# Patient Record
Sex: MALE | Race: WHITE | NOT HISPANIC OR LATINO | Employment: UNEMPLOYED | URBAN - METROPOLITAN AREA
[De-identification: names, ages, dates, MRNs, and addresses within clinical notes are randomized per-mention and may not be internally consistent; named-entity substitution may affect disease eponyms.]

---

## 2023-11-28 PROBLEM — Z96.22 MYRINGOTOMY TUBE STATUS: Status: ACTIVE | Noted: 2023-11-28

## 2023-11-28 PROBLEM — R79.89 ABNORMAL TSH: Status: ACTIVE | Noted: 2023-11-28

## 2023-11-28 PROBLEM — H93.13 BILATERAL TINNITUS: Status: ACTIVE | Noted: 2023-11-28

## 2023-11-28 PROBLEM — C11.9 NASOPHARYNGEAL CARCINOMA (MULTI): Status: ACTIVE | Noted: 2023-11-28

## 2023-11-28 PROBLEM — H90.3 SENSORINEURAL HEARING LOSS, BILATERAL: Status: ACTIVE | Noted: 2023-11-28

## 2023-12-01 ENCOUNTER — APPOINTMENT (OUTPATIENT)
Dept: PEDIATRIC HEMATOLOGY/ONCOLOGY | Facility: HOSPITAL | Age: 23
End: 2023-12-01
Payer: MEDICARE

## 2023-12-15 NOTE — PROGRESS NOTES
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   End of therapy  was April 2019.  Here for his 4.5 year therapy survivorship visit.     Magdi continues to have numerous health issues.    Mental Health Alek sees psychiatrist every 2 months, and therapist for 2 times a month.  Continues on bupropion for ADHD and sertraline for anxiety.  Is interested in finding a counselor.     Endocrine:For his hypothyroid, he is seeing an endocrinologist- follows with Dr. Knowles in [place].   .  Sees audiologist but is due  for follow up.    Hearing: Still has tinnitus, hearing continues to be an issue.    Skin: Noticed some red dots to right hand    Dental issues:   has trouble swalloing since having teeth removed    GI:  Constipated, has hemmoroid PCP recommended increase water intake. Hasn't really helped    Diet:  Drinks pepsi everyday, doesn't drink much water, mostly pre preapred, quick or fast food for meals.      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  Stable    Eyes:  Went to eye doctor , thinks they maybe due for a visit    Sleep:  Doesn't snore,sometimes so tired doesn't get up to eat dinner.  Doesn't have a set sleep schedule,  12-18 hours on and off.   Goes to bed late, sleeps 9-6 hours at a time. Momworks 7-4 hours    Chronic Sinuitis Sinus congestion, daily, feels sometimes it is infected.  See ENT for this Dr. Marcano in [facility].   Received antibiotics for this.  Has been having issues with swallowing since his teeth where removed.    Lives at home with parents, siblings, and family members(9 in total in the home)   Lymphadenopathy: Resolved  Denies Shortness of breath, Dyspnea on exertion, Orthopnea,  Chest pain, Palpitations.  History of asthma no trouble recently.   No easy bleeding or bruising.   Reviewed survivorship guidelines with patient and family.          Oncology History Overview Note   Heme Onc Non-AJCC Information:    Nasopharyngeal carcinoma, with b/l cervical lymph node involvement with no distant metastasis , T2 N2 M0 - Stage 3 per AJCC staging,  treated as per ARAR 0331. Diagnosed with sinus biopsy 4/4/19, subsequent lymph node biopsy also positive for disease.  Started therapy as per [id number] with cycle 1 chemotherapy on 5/1/19 (cisplatin & CI 5-FU)  6/21: Admit for Cycle 3 chemo.   7/11: repeat scans completed s/p Cycle 3 Induction chemo. Scans improved.   7/24: GT placed by Dr. Mejia prior to starting radiation  7/29/19: Start of Radiation  7/30: Admit for Cycle 1 of Consolidation chemo (cisplatin concurrent with RT)  8/20/19: Admit for Cycle 2 of Consolidation chemo (cisplatin concurrent with RT)  8/26-8/30/19 admitted for dehydration  9/10/19: Admitted for mucositis and dehydration  9/17/19: Completed radiation  10/30/19: End of therapy scans which showed: expected changes s/p chemo and radiation. Without evidence of recurrent or residual disease.   PEG tube removed 11.5.19  Mediport removed on 11.22.19     Nasopharyngeal carcinoma (CMS/HCC)   11/28/2023 Initial Diagnosis    Nasopharyngeal carcinoma (CMS/HCC)       ROS  Constitutional: Good energy, no frequent illnesses  Cardiovascular: no palpitations, no exercise intolerance, no chest pain  Urinary tract: no blood in urine, no difficulty with urination  Hematologic: no bruising, no bleeding, no pallor  Eyes: no scleral icterus, no drainage  Respiratory: no cough, no chest pain, no shortness of breath  Skin: no rashes, no moles, no new lesions  Musculoskeletal: no pain  ENT: no rhinorrhea, no problems swallowing, no sore throat  GI: no abdominal pain, no nausea, no emesis, no diarrhea, no constipation  Endocrine: no frequent  urination  Immunologic/allergic: negative   Neurologic: no frequent headaches, no weakness  Psychiatric/behavioral: negative      Objective   Physical Exam:  Vital Signs for this encounter:  Weight 99.5 kg  RR 20  /74 Temp 36.5  General:Well appearing,no distress  HEENT: NCAT, No oral lesions, no rhinorrhea, throat no erythema, no scleral icterus  Lungs:  Clear to auscultation bilaterally, no respiratory distress  Abdomen: Soft, not tender, not distended, no organomegaly  : not done (discussed self testicular exams)  Extremities: Warm well perfused  MSK: Good muscle tone and bulk  Neuro: alert, oriented, non focal exam  Lymph: Shotty anterior cervical LAD, no axillary or supraclavicular nodes  Skin:  no rashes or lesions, no bruising or petechiae    Hospital Outpatient Visit on 01/05/2024   Component Date Value Ref Range Status    Vitamin D, 25-Hydroxy, Total 01/05/2024 79  30 - 100 ng/mL Final    Color, Urine 01/05/2024 Yellow  Straw, Yellow Final    Appearance, Urine 01/05/2024 Clear  Clear Final    Specific Gravity, Urine 01/05/2024 1.021  1.005 - 1.035 Final    pH, Urine 01/05/2024 5.0  5.0, 5.5, 6.0, 6.5, 7.0, 7.5, 8.0 Final    Protein, Urine 01/05/2024 NEGATIVE  NEGATIVE mg/dL Final    Glucose, Urine 01/05/2024 NEGATIVE  NEGATIVE mg/dL Final    Blood, Urine 01/05/2024 NEGATIVE  NEGATIVE Final    Ketones, Urine 01/05/2024 NEGATIVE  NEGATIVE mg/dL Final    Bilirubin, Urine 01/05/2024 NEGATIVE  NEGATIVE Final    Urobilinogen, Urine 01/05/2024 <2.0  <2.0 mg/dL Final    Nitrite, Urine 01/05/2024 NEGATIVE  NEGATIVE Final    Leukocyte Esterase, Urine 01/05/2024 NEGATIVE  NEGATIVE Final    WBC 01/05/2024 7.5  4.4 - 11.3 x10*3/uL Final    nRBC 01/05/2024 0.0  0.0 - 0.0 /100 WBCs Final    RBC 01/05/2024 5.22  4.50 - 5.90 x10*6/uL Final    Hemoglobin 01/05/2024 15.2  13.5 - 17.5 g/dL Final    Hematocrit 01/05/2024 45.7  41.0 - 52.0 % Final    MCV 01/05/2024 88  80 - 100 fL Final    MCH 01/05/2024 29.1  26.0  - 34.0 pg Final    MCHC 01/05/2024 33.3  32.0 - 36.0 g/dL Final    RDW 01/05/2024 12.5  11.5 - 14.5 % Final    Platelets 01/05/2024 246  150 - 450 x10*3/uL Final    Neutrophils % 01/05/2024 65.9  40.0 - 80.0 % Final    Immature Granulocytes %, Automated 01/05/2024 0.4  0.0 - 0.9 % Final    Immature Granulocyte Count (IG) includes promyelocytes, myelocytes and metamyelocytes but does not include bands. Percent differential counts (%) should be interpreted in the context of the absolute cell counts (cells/UL).    Lymphocytes % 01/05/2024 22.3  13.0 - 44.0 % Final    Monocytes % 01/05/2024 6.8  2.0 - 10.0 % Final    Eosinophils % 01/05/2024 4.1  0.0 - 6.0 % Final    Basophils % 01/05/2024 0.5  0.0 - 2.0 % Final    Neutrophils Absolute 01/05/2024 4.93  1.20 - 7.70 x10*3/uL Final    Percent differential counts (%) should be interpreted in the context of the absolute cell counts (cells/uL).    Immature Granulocytes Absolute, Au* 01/05/2024 0.03  0.00 - 0.70 x10*3/uL Final    Lymphocytes Absolute 01/05/2024 1.67  1.20 - 4.80 x10*3/uL Final    Monocytes Absolute 01/05/2024 0.51  0.10 - 1.00 x10*3/uL Final    Eosinophils Absolute 01/05/2024 0.31  0.00 - 0.70 x10*3/uL Final    Basophils Absolute 01/05/2024 0.04  0.00 - 0.10 x10*3/uL Final    Albumin 01/05/2024 4.7  3.4 - 5.0 g/dL Final    Bilirubin, Total 01/05/2024 0.3  0.0 - 1.2 mg/dL Final    Bilirubin, Direct 01/05/2024 0.0  0.0 - 0.3 mg/dL Final    Alkaline Phosphatase 01/05/2024 88  33 - 120 U/L Final    ALT 01/05/2024 23  10 - 52 U/L Final    Patients treated with Sulfasalazine may generate falsely decreased results for ALT.    AST 01/05/2024 17  9 - 39 U/L Final    Total Protein 01/05/2024 7.4  6.4 - 8.2 g/dL Final    Magnesium 01/05/2024 1.57 (L)  1.60 - 2.40 mg/dL Final    Glucose 01/05/2024 88  74 - 99 mg/dL Final    Sodium 01/05/2024 142  136 - 145 mmol/L Final    Potassium 01/05/2024 4.0  3.5 - 5.3 mmol/L Final    Chloride 01/05/2024 103  98 - 107 mmol/L Final     Bicarbonate 01/05/2024 32  21 - 32 mmol/L Final    Anion Gap 01/05/2024 11  10 - 20 mmol/L Final    Urea Nitrogen 01/05/2024 18  6 - 23 mg/dL Final    Creatinine 01/05/2024 1.02  0.50 - 1.30 mg/dL Final    eGFR 01/05/2024 >90  >60 mL/min/1.73m*2 Final    Calculations of estimated GFR are performed using the 2021 CKD-EPI Study Refit equation without the race variable for the IDMS-Traceable creatinine methods.  https://jasn.asnjournals.org/content/early/2021/09/22/ASN.6246783242    Calcium 01/05/2024 10.2  8.6 - 10.6 mg/dL Final    Phosphorus 01/05/2024 3.0  2.5 - 4.9 mg/dL Final    The performance characteristics of phosphorus testing in heparinized plasma have been validated by the individual  laboratory site where testing is performed. Testing on heparinized plasma is not approved by the FDA; however, such approval is not necessary.             Assessment/Plan      Alek is a 23 y.o. 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 He completed  chemotherapy on 8.21.19. PET CT after 3 cycles of induction was negative. He started radiation on 7/29 and completed on 9/17/19. He experienced known side effects of radiation including mucositis, decrease PO intake and skin breakdown, as a result PEG  tube was placed. No evidence of residual or recurrent disease on MRI brain/neck/sinus and CT chest at end of therapy.      Here today for a survivorship visit (EOT 10/30/19). Scans, PE, and labs show no evidence of recurrent disease.         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 He has complications  from treatment most significant for severely impaired dentition, hearing loss,  hypothyoroid and chronic  sinusitis.  Secondary Malignancy: Secondary benign or malignant neoplasm occurring in or near radiation field due to history of Radiation:         Brain tumor (benign or malignant), tyroid nodules, thyroid cancer.  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  Recommend annual audiograms, he saw audio in 2.2021, and has not been back.  It appears his [facility] does hearing support recommended they follow up with them for future testing.  Will reach out to their office.       History of radiation and central line placement, increase Jacksons risk of: Thrombosis; Vascular insufficiency; Post-thrombotic syndrome, and Coronary Vascular Disease  - Blood pressure stable, no evidence of venous statis  Jacksons history of Radiation increase his likely [place] for Clinical leukoencephalopathy, Neurocognitive deficits and/ or Cerebrovascular complications   He also has a medical history of CP which can compound this.    -IS interested in adult Neurocognitive evaluation.   Referral Made last visit but did not occur, will follow up next visit  - PT ordered today due to changes in muscle tone.  Will either try virtual or local PT.  Orders faxed, Referral Made but did not occur, will follow up next visit  Dental: Head and Neck radation increase risk of Xerostomia; Salivary gland dysfunction, Osteoradionecrosis of the jaw, Dental abnormalities Temporomandibular joint dysfunction.  No longer has any teeth.      Endocrine:  His radiation history is the likely cause of his thyroid dysfucntion.  Also at risk for Central adrenal insufficiency.    - Follows with endocrine in [place]- reached out  to his CNP today [place]  to follow up  - Today blood glucose wnl  Chronic sinusitis:  Likely 2/2 to radiation.  Follows with ENT, reached out to them to follow up  Sleep Medicine: Reccomened he see referral made.    Risk of cataracts  and ocular toxicity Encourage annual eye exam. [place]   Testicular Dysfunction:  At risk for      Impaired spermatogenesis,                  Testicular hormonal dysfunction due to cisplatin.  Gonadotropin deficiency related to radiation.    Referral Made to [facility], has not occurred encouraged follow up  Psych: He follows up with a therapist and physiatrist. Tosin rojas provide additional support     Goes to [facility] for PCP.  Encouraged PCP Follow up     RTC: Summer 2024 for survivorship  This is a shared visit. I have reviewed the Patrice Lindsey NP encounter note, approve her documentation.    Lauren Lee MD      Return  Alek will return in 6 months.

## 2024-01-05 ENCOUNTER — HOSPITAL ENCOUNTER (OUTPATIENT)
Dept: PEDIATRIC HEMATOLOGY/ONCOLOGY | Facility: HOSPITAL | Age: 24
Discharge: HOME | End: 2024-01-05
Payer: MEDICARE

## 2024-01-05 VITALS
HEIGHT: 69 IN | RESPIRATION RATE: 20 BRPM | WEIGHT: 219.36 LBS | SYSTOLIC BLOOD PRESSURE: 113 MMHG | BODY MASS INDEX: 32.49 KG/M2 | TEMPERATURE: 97.7 F | DIASTOLIC BLOOD PRESSURE: 74 MMHG | HEART RATE: 113 BPM

## 2024-01-05 DIAGNOSIS — F51.05 INSOMNIA DUE TO OTHER MENTAL DISORDER: ICD-10-CM

## 2024-01-05 DIAGNOSIS — C11.9 NASOPHARYNGEAL CARCINOMA (MULTI): Primary | ICD-10-CM

## 2024-01-05 DIAGNOSIS — F99 INSOMNIA DUE TO OTHER MENTAL DISORDER: ICD-10-CM

## 2024-01-05 LAB
25(OH)D3 SERPL-MCNC: 79 NG/ML (ref 30–100)
ALBUMIN SERPL BCP-MCNC: 4.7 G/DL (ref 3.4–5)
ALP SERPL-CCNC: 88 U/L (ref 33–120)
ALT SERPL W P-5'-P-CCNC: 23 U/L (ref 10–52)
ANION GAP SERPL CALC-SCNC: 11 MMOL/L (ref 10–20)
APPEARANCE UR: CLEAR
AST SERPL W P-5'-P-CCNC: 17 U/L (ref 9–39)
BASOPHILS # BLD AUTO: 0.04 X10*3/UL (ref 0–0.1)
BASOPHILS NFR BLD AUTO: 0.5 %
BILIRUB DIRECT SERPL-MCNC: 0 MG/DL (ref 0–0.3)
BILIRUB SERPL-MCNC: 0.3 MG/DL (ref 0–1.2)
BILIRUB UR STRIP.AUTO-MCNC: NEGATIVE MG/DL
BUN SERPL-MCNC: 18 MG/DL (ref 6–23)
CALCIUM SERPL-MCNC: 10.2 MG/DL (ref 8.6–10.6)
CHLORIDE SERPL-SCNC: 103 MMOL/L (ref 98–107)
CO2 SERPL-SCNC: 32 MMOL/L (ref 21–32)
COLOR UR: YELLOW
CREAT SERPL-MCNC: 1.02 MG/DL (ref 0.5–1.3)
EOSINOPHIL # BLD AUTO: 0.31 X10*3/UL (ref 0–0.7)
EOSINOPHIL NFR BLD AUTO: 4.1 %
ERYTHROCYTE [DISTWIDTH] IN BLOOD BY AUTOMATED COUNT: 12.5 % (ref 11.5–14.5)
GFR SERPL CREATININE-BSD FRML MDRD: >90 ML/MIN/1.73M*2
GLUCOSE SERPL-MCNC: 88 MG/DL (ref 74–99)
GLUCOSE UR STRIP.AUTO-MCNC: NEGATIVE MG/DL
HCT VFR BLD AUTO: 45.7 % (ref 41–52)
HGB BLD-MCNC: 15.2 G/DL (ref 13.5–17.5)
IMM GRANULOCYTES # BLD AUTO: 0.03 X10*3/UL (ref 0–0.7)
IMM GRANULOCYTES NFR BLD AUTO: 0.4 % (ref 0–0.9)
KETONES UR STRIP.AUTO-MCNC: NEGATIVE MG/DL
LEUKOCYTE ESTERASE UR QL STRIP.AUTO: NEGATIVE
LYMPHOCYTES # BLD AUTO: 1.67 X10*3/UL (ref 1.2–4.8)
LYMPHOCYTES NFR BLD AUTO: 22.3 %
MAGNESIUM SERPL-MCNC: 1.57 MG/DL (ref 1.6–2.4)
MCH RBC QN AUTO: 29.1 PG (ref 26–34)
MCHC RBC AUTO-ENTMCNC: 33.3 G/DL (ref 32–36)
MCV RBC AUTO: 88 FL (ref 80–100)
MONOCYTES # BLD AUTO: 0.51 X10*3/UL (ref 0.1–1)
MONOCYTES NFR BLD AUTO: 6.8 %
NEUTROPHILS # BLD AUTO: 4.93 X10*3/UL (ref 1.2–7.7)
NEUTROPHILS NFR BLD AUTO: 65.9 %
NITRITE UR QL STRIP.AUTO: NEGATIVE
NRBC BLD-RTO: 0 /100 WBCS (ref 0–0)
PH UR STRIP.AUTO: 5 [PH]
PHOSPHATE SERPL-MCNC: 3 MG/DL (ref 2.5–4.9)
PLATELET # BLD AUTO: 246 X10*3/UL (ref 150–450)
POTASSIUM SERPL-SCNC: 4 MMOL/L (ref 3.5–5.3)
PROT SERPL-MCNC: 7.4 G/DL (ref 6.4–8.2)
PROT UR STRIP.AUTO-MCNC: NEGATIVE MG/DL
RBC # BLD AUTO: 5.22 X10*6/UL (ref 4.5–5.9)
RBC # UR STRIP.AUTO: NEGATIVE /UL
SODIUM SERPL-SCNC: 142 MMOL/L (ref 136–145)
SP GR UR STRIP.AUTO: 1.02
UROBILINOGEN UR STRIP.AUTO-MCNC: <2 MG/DL
WBC # BLD AUTO: 7.5 X10*3/UL (ref 4.4–11.3)

## 2024-01-05 PROCEDURE — 36415 COLL VENOUS BLD VENIPUNCTURE: CPT | Performed by: PEDIATRICS

## 2024-01-05 PROCEDURE — 83735 ASSAY OF MAGNESIUM: CPT | Performed by: PEDIATRICS

## 2024-01-05 PROCEDURE — 82306 VITAMIN D 25 HYDROXY: CPT | Performed by: PEDIATRICS

## 2024-01-05 PROCEDURE — 84155 ASSAY OF PROTEIN SERUM: CPT | Performed by: PEDIATRICS

## 2024-01-05 PROCEDURE — 81003 URINALYSIS AUTO W/O SCOPE: CPT | Performed by: PEDIATRICS

## 2024-01-05 PROCEDURE — 85025 COMPLETE CBC W/AUTO DIFF WBC: CPT | Performed by: PEDIATRICS

## 2024-01-05 PROCEDURE — 84100 ASSAY OF PHOSPHORUS: CPT | Performed by: PEDIATRICS

## 2024-01-05 RX ORDER — ACETAMINOPHEN 500 MG
2000 TABLET ORAL DAILY
Qty: 30 CAPSULE | Refills: 0 | Status: SHIPPED | OUTPATIENT
Start: 2024-01-05

## 2024-01-05 RX ORDER — POLYETHYLENE GLYCOL 3350 17 G/17G
17 POWDER, FOR SOLUTION ORAL DAILY
Qty: 510 G | Refills: 0 | Status: SHIPPED | OUTPATIENT
Start: 2024-01-05 | End: 2024-02-04

## 2024-01-05 ASSESSMENT — ENCOUNTER SYMPTOMS
LOSS OF SENSATION IN FEET: 0
DEPRESSION: 0
OCCASIONAL FEELINGS OF UNSTEADINESS: 0

## 2024-01-05 ASSESSMENT — PAIN SCALES - GENERAL: PAINLEVEL: 0-NO PAIN

## 2024-01-05 NOTE — PROGRESS NOTES
This provider meet with patient and his parents during his annual survivorship visit. He endorses anxiety and reports having low energy and motivation, his mother wondered if Alek may be experiencing depression. He also reports sleeping during the day and at night, and not feeling rested. This information was shared with his medical oncology team. Patient agreed to meet with this provider for further evaluation. He was given this providers scheduling information.

## 2024-01-05 NOTE — PROGRESS NOTES
01/05/24 1110   Reason for Consult   Discipline Child Life Specialist   Patient Intervention(s)   Type of Intervention Performed Healing environment interventions   Healing Environment Intervention(s) Expressive outlet;Normalization of environment;Empathetic listening/validation of emotions     Family and Child Life Services

## 2024-01-05 NOTE — PATIENT INSTRUCTIONS
Hearing:  Call and make a sooner followup with  [phone].To talk about hearing and swallowing Problem    [phone]      Goal Drink - 3 bottles  of water a day nfor 1 month increase to 4 bottles in 1 month (recommend 64oz)    Patrice will reach out to  and Dr. Knowles to update about the visit  Check out [facility] for care.

## 2024-01-24 ENCOUNTER — HOSPITAL ENCOUNTER (OUTPATIENT)
Dept: PEDIATRIC HEMATOLOGY/ONCOLOGY | Facility: HOSPITAL | Age: 24
End: 2024-01-24
Payer: MEDICARE

## 2024-01-25 ENCOUNTER — HOSPITAL ENCOUNTER (OUTPATIENT)
Dept: PEDIATRIC HEMATOLOGY/ONCOLOGY | Facility: HOSPITAL | Age: 24
Discharge: HOME | End: 2024-01-25
Payer: MEDICARE

## 2024-01-25 DIAGNOSIS — F41.1 GAD (GENERALIZED ANXIETY DISORDER): ICD-10-CM

## 2024-01-25 DIAGNOSIS — F90.2 ATTENTION DEFICIT HYPERACTIVITY DISORDER (ADHD), COMBINED TYPE, MODERATE: Primary | ICD-10-CM

## 2024-01-25 DIAGNOSIS — F84.0 AUTISM SPECTRUM DISORDER (HHS-HCC): ICD-10-CM

## 2024-01-25 DIAGNOSIS — F33.1 MDD (MAJOR DEPRESSIVE DISORDER), RECURRENT EPISODE, MODERATE (MULTI): ICD-10-CM

## 2024-01-25 PROCEDURE — 99215 OFFICE O/P EST HI 40 MIN: CPT | Performed by: NURSE PRACTITIONER

## 2024-01-26 ENCOUNTER — DOCUMENTATION (OUTPATIENT)
Dept: PEDIATRIC HEMATOLOGY/ONCOLOGY | Facility: HOSPITAL | Age: 24
End: 2024-01-26
Payer: MEDICARE

## 2024-01-26 NOTE — PROGRESS NOTES
Patient contacted by this provider and notified that Dr. Opal Morocho's office will be in touch with him next week to schedule him for neuropsychiatric testing for c/o short term memory issues, inattention and lack of focus.

## 2024-01-26 NOTE — PROGRESS NOTES
"Jarrell Gaston is a 23 y.o. male who presents today for evaluation of anxiety. He attended this virtual visit unaccompanied. Informed consent was obtained. Disposition: home.     .      Alek presents today with anxiety, ADD, and Autism Spectrum Disorder. He initially exhibited symptoms of depressed mood beginning  12  years ago.  Previous psychological history is significant for ADHD, anxiety, autism, and depression. Family history is significant for ADHD, autism, learning disability, and OCD . He reports that he takes Sertraline 50 I oral tablet once daily for mood. He reports \"I have taken other meds in the past. I can't remember everything I have been on.\"      Adolescent Psych Interview:  Home: The patient lives at home with both parents and four siblings..  Education: The patient graduated High School.    Employment: The patient is not employed.  Eating: The patient eats on a regular basis. He declares \"I don't always eat the healthiest.\"   Activities: The patient has few friends.  .  Drugs:  Denies.  Sexuality: The patient denies current or previous sexual activity.  Safety: He was hospitalized for SA in 2017. He cut his wrist when he broke up with his girlfriend. He was admitted for one week. Denies further SI related incidents.  Suicide/Depression: Current depressive symptoms include: Mood disturbance, characterized by anxiety, helplessness, hopelessness, and irritability.    PROS:  Depression: denies anhedonia, loss of interest, endorses low motivation, energy, poor concentration, focus, task completion, feelings of hopeless, helpless, worthless, psychomotor retardation/excess, delayed sleep onset of 30 minutes or more, denies issues w/ appetite, denies SI-HI. Reports that he has experienced depression \"on and off\" since early childhood. Reports \"I used to have a real hard time controlling my anger, but its gotten better.\" Rates it a 3/10 1-2 days a week. Denies impaired function.  Anxiety: " "Endorses worry, nervousness, restlessness, irritability, denies panic, racing mind. Endorses anxiety since early childhood. Rates is a 5/10 most days. Reports \"I used to have social anxiety too but that has gotten better. Denies impaired function.  Penny: denies impulsivity, recklessness, pressure speech, high energy state, little need for sleep, FOI, grandiosity.  OCD: denies obsessive thoughts, rituals or compulsions  ADHD: Endorses issues with concentration, focus, task completion since early childhood. Endorses short term memory loss ( more recent)  Psychosis: denies AVH, paranoia, delusions  Trauma: denies nightmares, flashbacks, avoidance/triggers, endorses easy startle, denies hypervigilance, reports losing time and forgetting the days of the week along with short term memory loss. He feels like these issues started before cancer treatment.  SI-HI: denies SI-HI intent, plan, access  Developmental issues: denies  Behavioral issues: denies    Objective   General appearance: Age appropriate. Unkempt appearance.  Orientation: Normal  Affect: Normal  Behavior: Normal  Cognitive function: Normal  Concentration: Abnormal- Endorses ongoing issues w/ focus, concentration, and short term memory loss.  Communicative abilities: Normal  Evidence of self-harm: absent  Judgement and insight: Normal  Impulse control: Normal  Indications of substance abuse: absent  Memory: Abnormal- Endorses issues with short term memory loss.  Thought processes: Normal  Homicidal ideation: absent  Suicidal ideation: absent    Assessment   Previous psychological history is significant for ADHD, anxiety, autism, and depression.Alek presents today with anxiety, ADD, and Autism Spectrum Disorder. He initially exhibited symptoms of depressed mood 12 years ago, when he was in . He reports having a history of being irritable during his early childhood years. He reports that he takes his medication as prescribed, denies SE or " "concerns. He reports meeting with his therapist on a weekly basis, and finds it helpful. He endorses depression \"on and off my whole life\" during this visit he reports fluctuating mood, having periods of low energy/motivation, feeling hopeless/helpless, psychomotor retardation/and restlessness, and at times delayed sleep onset. Denies impaired function.He also endorses anxiety and irritability. Reports that he has felt anxious since early childhood. He rates it a 5/10 most days. Denies impaired function. He reports ongoing issues, since childhood with focus, concentration , and more recently short term memory loss. Please see PROS for full description of symptoms. He denies Si-HI intent, plan, access.  PF: family, hopeful today, future and goal oriented, engaged with providers.   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    Plan   Patient has a psychiatrist and a psychologist through [facility].   This provider recommended further evaluation  for Neuropsych Evaluation / Testing, specific to ADHD/ADD (ongoing), and short term memory loss (new). Patient agrees to do so.   His short term goal is to get his 's license.   His long term goal is to enroll in [employer] this fall. He is interested in organic and inorganic chemistry and bio-engineering. He would like to work in the area of prosthetics  Note: He reports that he has an appointment for a sleep study \"coming up soon\" due to c/o constant fatigue.   Follow-UP: He will continue to follow up with his current psychiatric providers at [place]. He has this provider contact information should he require cancer specific mental health care. Attending notified of request for referral to neuropsych.  "

## 2024-02-02 NOTE — PROGRESS NOTES
[facility] Sleep Medicine  POR [address]  [facility]  [address]  [facility] [address]     [facility] Sleep Medicine Clinic  New Visit Note      Subjective   Patient ID: Alek Frederick is a 23 y.o. male with past medical history significant for Obesity, Sleep disturbance, ADHD, Nasal pharyngeal carcinoma, and Sleep disorder breathing.    2/8/24: The patient is here accompanied by his mother,  who adds to his history and referred by Patrice West, APRN-CNP, for comprehensive sleep medicine evaluation due to suspected sleep apnea. He has symptoms of excessive daytime sleepiness and tiredness and does not get up to eat dinner.  Mom works from 7 AM-4 PM. His  ESS is 14, and JAYJAY is 22  today.    HPI  Patient had been having these symptoms for the past 2-3 years.   Patient never had sleep study done yet.       SLEEP STUDY HISTORY: (personally reviewed raw data such as interpretation report, data sheet, hypnogram, and titration table if available and applicable)  N/A    SLEEP-WAKE SCHEDULE  Bedtime: 9-11 PM on weekdays, same on weekends  Subjective sleep latency: 30 minutes or more  Problems falling asleep: Y  Number of awakenings: 1-2 times per night spontaneously for unknown reasons  Falls back asleep in 20-30 minutes  Problems staying asleep: Y  Final wake time: 3-5 AM on weekdays, same on weekends  Out of bed time: 5 AM on weekdays, same on weekends  Shift work: N  Naps: Yes  Feels rested after a nap: No   Average sleep duration (excluding naps): 6-8 hours    SLEEP ENVIRONMENT  Sleep location: bed  Sleep status: sleeps alone  Room is dark:  Yes  Room is quiet: Yes  Room is cool: Yes  Bed comfort: good    SLEEP HABITS:   Activities before bedtime: play cell phone  Activities in bed: play cell phone  Preferred sleep position: right side    SLEEP ROS:  Night symptoms: unknown  Morning symptoms: POSITIVE for unrefreshing sleep and morning  dry mouth  Daytime symptoms POSITIVE for excessive daytime sleepiness and fatigue  Hypersomnia / narcolepsy symptoms: Patient denies symptoms of a hypersomnolence disorder such as sleep paralysis, sleep-related hallucinations, recurrent sleep attacks, automatic behaviors, and cataplexy.   RLS symptoms: Patient denies RLS symptoms.  Movements in sleep: Patient denies problematic movements in sleep,   Parasomnia symptoms: Patient denies symptoms of parasomnia.    WEIGHT: stable    REVIEW OF SYSTEMS: All other systems have been reviewed and are negative.    PERTINENT SOCIAL HISTORY:  Occupation: No  Smoking: No   ETOH: No   Marijuana: No   Caffeine: Yes  Sleep aids: No   Claustrophobia: No     PERTINENT PAST SURGICAL HISTORY:  no    PERTINENT FAMILY HISTORY:  sleep apnea- uncle    Active Problems, Allergy List, Medication List, and PMH/PSH/FH/Social Hx have been reviewed and reconciled in chart. No significant changes unless documented in the pertinent chart section. Updates made when necessary.       Objective   Vitals:    02/08/24 0910   BP: 115/73   Pulse: 99   Resp: 16   SpO2: 97%        Physical Exam  Constitutional:alert and oriented to time, place, and person  Sinus: - tenderness to palpation  Palate:  Narrow   Mallampati 3, - Tongue scalloping, + macroglossia, no teeth  Lungs: Clear to auscultation bilateral, no rales  Heart: Regular rate and rhythm, no murmurs    Assessment/Plan   Alek Frederick is a 23 y.o. male who is seen to evaluate for possible obstructive sleep apnea. The pathophysiology of sleep apnea, diagnostic testing (HST vs PSG), treatment options (PAP, oral appliance, surgery, hypoglossal nerve stimulator called Inspire), and supportive management (weight loss, positional therapy, smoking cessation, avoidance of alcohol and sedatives) were discussed with the patient in detail. Risk factors of sleep apnea as well as cardiometabolic and neurocognitive sequelae associated with untreated sleep apnea  were also discussed. Lastly, patient was advised to avoid driving vehicle or operating heavy machinery when sleepy.     Alek Frederick with the following problems:     # SLEEP DISORDERED BREATHING:  -This is likely sleep apnea based on the the history and physical examination. STOP-BANG: 3  Alek Eli not yet had a sleep study.  -Instruct patient to complete an in lab sleep study.  -We consider treatment as indicated when testing is complete.     # CHRONIC SLEEP ONSET/ SLEEP MAINTENANCE INSOMNIA:  -likely due to poor sleep hygiene, and  untreated sleep apnea.   -JAYJAY:23    # DEPRESSION and ANXIETY:   -Alek Frederickis taking Bupropion, and Sertraline well.  -Denies HI/SI     # OBESITY:  -with a BMI of 30.96. Alek Frederick most recent Bicarbonate was 32 in Jan,2024.  -Encourage to have regular exercise to manage weight well.    # NASAL CONGESTION:  -Instruct Alek Douglas use appropriate Flonase spray to ease congestion.    # XEROSTOMIA:  -Instruct Alek Douglas purchase the Biotene gel to ease the dry mouth symptom,        # CIRCADIAN RHYTHM SLEEP-WAKE DISORDER:  -We will obtain sleep logs for two weeks to be brought to next clinic to discuss. We will consider actigraphy to compare and/or confirm sleep log findings.  Delayed Sleep Phase:   -Set wake time, light therapy in the morning.   -Sleep hygiene measures at set bedtime.  Advanced Sleep Phase:   -Set bedtime and light therapy in the evening.   -Sleep hygiene measures at set bedtime.  Irregular Sleep Phase:  -Set bedtime and wake time.   -Daytime routine including scheduled physical activity, social activity and meal times.  Non-24 Sleep-Wake Rhythm:  -Set bedtime and wake time.   -Daytime routine including scheduled physical activity, social activity and meal times.  Shift Work:  -Maximize sleep time to 7-8 hours.   -Make sure your room is dark, quiet, cool and interruptions are eliminated.   -Avoid light in the mornings, wear dark sunglasses  driving home.   -Expose yourself to bright light or daylight upon waking.   -Avoid caffeine 8 hours prior to sleeping.   -We will consider modafinil, a mild stimulant, if these strategies are not effective.  Jet Lag:  -Try to change your sleep/wake schedule two to three days prior to travel.   -Expose yourself to bright light when you want to be awake.   -Avoid bright light and electronic light when you are nearing time to sleep.   -You can take melatonin OTC as needed 1 hour prior to bedtime as needed.     RTC      All of patient's questions were answered. He verbalizes understanding and agreement with my assessment and plan.

## 2024-02-02 NOTE — PATIENT INSTRUCTIONS
[facility] Sleep Medicine  [address]  [facility]  [address]  [place] [facility] [address]       Thank you for coming to the Sleep Medicine Clinic today! Your sleep medicine provider today was: LEONARD Martin Below is a summary of your treatment plan, patient education, other important information, and our contact numbers.    Dear Alek Frederick,    Thank you for visiting  Sleep Medicine Clinic !     1. We will proceed with a PSG sleep study. The lab will call you and schedule it for you.     2. Please start practicing the sleep hygiene  as discussed in clinic today.     3. Please start practicing the appropriate nasal spray at night to ease your nasal congestion as discussed in clinic today, and using Biotene to ease your dry mouth.      4. FOR QUESTIONS AND CONCERNS:   a) : To schedule, cancel, or reschedule SLEEP STUDY appointments, please call [phone]  b): Please call my office with issues or questions: [phone] ([place]); [phone] ([facility])      In the event that you are running more than 10 minutes late to your appointment, I will kindly ask you to reschedule. Thanks.      TREATMENT PLAN       Follow-up Appointment:   Follow-up in 2-3 weeks after sleep study.    PATIENT EDUCATION     Obstructive Sleep Apnea (OSWALDO) is a sleep disorder where your upper airway muscles relax during sleep and the airway intermittently and repetitively narrows and collapses leading to partially blocked airway (hypopnea) or completely blocked airway (apnea) which, in turn, can disrupt breathing in sleep, lower oxygen levels while you sleep and cause night time wakings. Because both apnea and hypopnea may cause higher carbon dioxide or low oxygen levels, untreated OSWALDO can lead to heart arrhythmia, elevation of blood pressure, and make it harder for the body to consolidate memory and facilitate metabolism (leading to higher blood sugars at night).  Frequent partial arousals occur during sleep resulting in sleep deprivation and daytime sleepiness. OSWALDO is associated with an increased risk of cardiovascular disease, stroke, hypertension, and insulin resistance. Moreover, untreated OSWALDO with excessive daytime sleepiness can increase the risk of motor vehicular accidents.    Some conservative strategies for OSWALDO regardless of OSWALDO severity are:   Positional therapy - Avoid sleeping on your back.   Healthy diet and regular exercise to optimize weight is highly encouraged.   Avoid alcohol late in the evening and sedative-hypnotics as these substances can make sleep apnea worse.   Improve breathing through the nose with intranasal steroid spray, saline rinse, or antihistamines    Safety: Avoid driving vehicle and operating heavy equipment while sleepy. Drowsy driving may lead to life-threatening motor vehicle accidents. A person driving while sleepy is 5 times more likely to have an accident. If you feel sleepy, pull over and take a short power nap (sleep for less than 30 minutes). Otherwise, ask somebody to drive you.    Treatment options for sleep apnea include weight management, positional therapy, Positive Airway Therapy (PAP) therapy, oral appliance therapy, hypoglossal nerve stimulator (Inspire) and select airway surgeries.    Sleep Testing for sleep apnea: The best and ideal way to check out if you have sleep apnea is to do an overnight sleep study in the sleep laboratory. Alternatively, a home sleep apnea test can also be done depending on your insurance and risk factors.     If you are having a home sleep apnea test, kindly allot 1 hour during pickup of the testing kit as you will have to complete paperwork and listen to the sleep technician for in-person on-the-spot demonstration and instructions on how to hook up the testing kit at home. Do the test for 1 day and start off with sleeping on your back. If you sleep on your side in the middle of night or you have  always been a side or stomach-sleeper, it is ok as long as you have some time on your back and off-back.     If you are having an overnight in-lab sleep study, please make sure to bring toiletries, a comfy pillow, additional warm blankets, and any nighttime medications (include as-needed inhaler, pain pill, etc) that you may regularly take. Also, be sure to eat dinner before you arrive as we generally do not provide meals inside the sleep testing center. Lastly, in order to fall asleep faster in the sleep testing center, we advise patients to wake up 2 hours earlier on the morning of scheduled testing and avoid napping 2 days prior testing. Sometimes, your sleep provider may prescribe a sleep aid to be taken at lights out in the sleep testing center. If you are taking a sleep aid, consider having somebody pick you up after the sleep testing.    Overnight sleep studies may be scheduled on a weekday or weekend. We also perform daytime testing for shift workers on a case-by-case basis.    Once you have booked an appointment to do the sleep study, please contact my office for follow-up visit to discuss results.    On the other hand, if you have any of the following, please consider calling the sleep testing center to RESCHEDULE your sleep study appointment:  If you tested positive for COVID within 10 days of your sleep study appointment.  If you were exposed to somebody who was confirmed for COVID within 10 days of your sleep study appointment and now you are having symptoms of possible COVID  If you have fever>100F or any acute symptoms that you think will lead to poor sleep during testing (e.g. new or worsening stuffy nose not relieved by steroid nasal spray)  If you have traveled domestically or internationally in the last month and now you are having symptoms of possible COVID    Insomnia, or trouble falling asleep or staying asleep, can be caused by many different things such as untreated sleep apnea, anxiety,  "depression, stress, poor sleep habits, and other medical conditions or medications. The best way to treat insomnia is to treat the cause. In general, we can all benefit from better sleep hygiene (aka good sleep habits). Some recommendations to help you improve your sleep so you can fall asleep, stay asleep, and wake up feeling refreshed include:    Keep a routine bedtime and rise time even on weekends and non-work days. This helps your biological clock stay in sync with your sleep needs. If you currently have variable sleep-wake schedule, start off by waking up and getting out of bed the same time every day, even on weekends or non-work days. Whether you have good or poor sleep, waking up at the same time every day can help re-train and reset your body clock.  Go to bed when you are sleepy and not when tired nor before your goal bedtime. Long periods of time in bed will lead to fragmented, shallow, broken sleep.   Use the bed for sleep and intimacy only. Do not watch TV, eat, read or use phone/laptop in bed. Keeping sleep as the only activity in bed will help re-associate bed equals sleep.  Get up when you cannot sleep in 20-30 minutes. When you are unable to sleep, exit the bed and go to another room or chair in bedroom, do something boring, calm, relaxing, distracting, or non-stimulating in dim lighting until you feel sleepy enough to fall back asleep. Avoid stimulating activity or any triggers for mental thinking (e.g. cellphone). Repeat as needed.  Avoid napping during the day to build up sleep pressure so that it won't be hard for you to fall asleep at night. Napping, particularly in the late afternoon or early evening may interfere with your night's sleep. If naps are necessary, limit naps under 15-20 minutes and not later than 3 PM.   Create a \"buffer zone\" or \"wind-down time\". This is a quiet time prior to bedtime, typically at least 30 minutes and perhaps as long as 1-2 hours. During this time, you should " do things that are enjoyable, relaxing, and not necessarily goal-oriented like performing relaxation exercises, listening to relaxing music, reading a boring book, dimming the lights, or eating a light bedtime snack like a glass of milk and banana which can promote sleep. Activities that promote relaxation before sleep is important because these can hep quiet the mind and relax the body to get into the right states for sleep.   Do not worry or plan in bed. If you are worrying, planning, feeling anxious, or cannot shut off your thoughts, get up and stay out of bed until you have quieted your mind. Set up a “scheduled worry time” in the morning or late afternoon to write down your worries and stressors as well as plans for the following day.   Keep your bedroom quiet, dark and cool. Ideal sleeping temperature is 65F. If light bothers you, get a slumber mask or blackout shades. If noise bothers you, put ear plugs or get a white noise machine. Alternatively, you may turn on fan or humidifier to create a constant background noise to eliminate unexpected sounds that would otherwise wake you up in the middle of your sleep.  Keep your bedroom technology free. Avoid TV and electronics 1-2 hours prior to bedtime. Also, turn the clock around to avoid clock-watching. Thoughts of the time can cause frustration and make it more difficult to go to sleep.   DO NOT TRY TOO HARD TO SLEEP. JUST ALLOW SLEEP TO UNFOLD.  Other things to avoid to help   Avoid  caffeine (including chocolate) intake in the late afternoon and early evening. Limit the amount of caffeine and consume before noon.   Avoid smoking 2-3 hours before bedtime. Like caffeine, nicotine in cigarette smoking acts a stimulant.   Avoid alcohol intake 2-3 hours before bedtime. Although alcohol may seem to help you fall asleep more easily as it slows down brain activity, it also disrupts your sleep during the night by causing frequent awakenings as the effect of alcohol  wears off. Also, alcohol worsens snoring and sleep apnea  Avoid eating a heavy meal (high-fat, high-carbohydrate, gas-producing foods) at dinner or 2-3 hours before bedtime.    Avoid drinking more than 8 oz liquids in the evening. Restrict fluids after dinner and void at bedtime.  Avoid physical exercise or hot shower / warm bath within 2 hours of bedtime. Regular exercise can improve sleep quality, but exercising or having a warm bath too close to bedtime can disrupt sleep onset. The best time to exercise to help sleep is in the late afternoon or early evening but not within 2 hours of bedtime. Warm baths or hot showers can be taken in the evening but not within 2 hours of going to bed.   Avoid sleeping with pets or kids if they appear to bother your sleep and/or sleep quality.    MOST IMPORTANTLY:  BE PATIENT!  BE CONSISTENT!  Your sleep problem developed over time so it will take some time and consistency to return to a more normal sleep pattern. By following the suggestions listed above, you should see gradual sleep improvements over time.    Also you have been recommended to do Cognitive Behavioral Therapy for Insomnia (CBT-I). CBT-I is widely recognized as an effective treatment for a wide range of insomnias. CBT-I is typically made up of a number of components including assessment, behavioral and cognitive interventions, motivational techniques, and relapse prevention skills. An overwhelming amount of evidence suggests that CBT-I is as effective as hypnotics and the newer non-benzodiazepine sleep aides in the acute treatment and is more effective than medication in the long term treatment of insomnia.     Below are some resources for CBT-I:  CBT-I at  with Floridalma Aakash, NP  GoToSleep- online course via [facility]. Self-guided. One time charge to sign up.  SleepEZ- Free self-guided course from the [facility] https://www.veterantraining.va.gov/insomnia/  Dr. Arenas - one on one CBT-I service [email or web address]    You can  also go to the following EDUCATION WEBSITES for further information:   American Academy of Sleep Medicine http://sleepeducation.org  National Sleep Foundation: https://sleepfoundation.org  American Sleep Apnea Association: https://www.sleepapnea.org (for patients with sleep apnea)  Narcolepsy Network: https://www.narcolepsynetwork.org (for patients with narcolepsy)  mymxlogpsy inc: https://www.Capturion Networkcolepsy.org (for patients with narcolepsy)  Hypersomnia Foundation: https://www.hypersomniafoundation.org (for patients with idiopathic hypersomnia)  RLS foundation: https://www.rls.org (for patients with restless leg syndrome)    IMPORTANT INFORMATION     Call 911 for medical emergencies.  Our offices are generally open from Monday-Friday, 8 am - 5 pm.   There are no supporting services by either the sleep doctors or their staff on weekends and Holidays, or after 5 PM on weekdays.   If you need to get in touch with me, you may either call my office number or you can use [employer].  If a referral for a test, for CPAP, or for another specialist was made, and you have not heard about scheduling this within a week, please call scheduling at [phone] ([phone]).  If you are unable to make your appointment for clinic or an overnight study, kindly call the office or sleep testing center at least 48 hours in advance to cancel and reschedule.  If you are on CPAP, please bring your device's card and/or the device to each clinic appointment.   In case of problems with PAP machine or mask interface, please contact your [employer] (Durable Medical Equipment) company first. [employer] is the company who provides you the machine and/or PAP supplies.       PRESCRIPTIONS     We require 7 days advanced notice for prescription refills. If we do not receive the request in this time, we cannot guarantee that your medication will be refilled in time.    IMPORTANT PHONE NUMBERS     Sleep Medicine Clinic Fax: [phone]  Appointments (for Pediatric  Sleep Clinic): [phone] - option 1  Appointments (for Adult Sleep Clinic): [phone] - option 2  Appointments (For Sleep Studies): [phone] - option 3  Behavioral Sleep Medicine: [phone]  Sleep Surgery: [phone]  Nutrition Service: [phone]  Weight management clinics with endocrinology: [phone]  Bariatric Services: [phone] (includes weight loss medications and weight loss surgery)  [facility]: [phone] (offers holistic approaches to weight management)  ENT (Otolaryngology): [phone]  Headache Clinic (Neurology): [phone]  Neurology: [phone]  Psychiatry: [phone]  Pulmonary Function Testing (PFT) Center: [phone]  Pulmonary Medicine: [phone]  [employer] ([employer]): [phone]      OUR SLEEP TESTING LOCATIONS     Our team will contact you to schedule your sleep study, however, you can contact us as follow:  Main Phone Line (scheduling only): [phone] ([phone]), option 3  Adult and Pediatric Locations  [facility] (6 years and older): [facility] - 4th floor ([address] [place] [address]) After hours line: [phone]  [facility] (Main campus: All ages): [facility], 6th floor. After hours line: [phone]   [place] (5 years and older; younger considered on case-by-case basis): [address]; [address], [address]. Scheduling  After hours line: [phone]   [place] (6 years and older): [address]; [address]; [address]   [place] (6 years and older): [address] [facility]  After hours line: [phone]   [facility] (13 years and older) in [place]: [address] [facility], 2nd floor  After hours line: [phone]   [place] (13 year and older): [address]  After hours line: [phone]     Adult Only Locations:   [place] (18 years and older): [address] [place] [address], 2nd floor    "[place] (18 years and older): [address] [facility]; 4th floor  After hours line: [phone]  [facility] (18 years and older) at [place]: [address] [facility]  After hours line: [phone]     CONTACTING YOUR SLEEP MEDICINE PROVIDER AND SLEEP TEAM      For issues with your machine or mask interface, please call your DME provider first. DME stands for durable medical company. DME is the company who provides you the machine and/or PAP supplies / accessories.   To schedule, cancel, or reschedule SLEEP STUDY APPOINTMENTS, please call the Main Phone Line at [phone] - option 3.   To schedule, cancel, or reschedule CLINIC APPOINTMENTS, you can do it in \"[employer]\", call [phone] to speak with my  (Cici Gonzalez), or call the Main Phone Line at [phone] - option 2  For CLINICAL QUESTIONS or MEDICATION REFILLS, please call direct line for Adult Sleep Nurses at [phone].   Lastly, you can also send a message directly to your provider through \"My Chart\", which is the email service through your  Records Account: [email or web address]       Here at [facility], we wish you a restful sleep!   "

## 2024-02-08 ENCOUNTER — OFFICE VISIT (OUTPATIENT)
Dept: SLEEP MEDICINE | Facility: CLINIC | Age: 24
End: 2024-02-08
Payer: MEDICARE

## 2024-02-08 VITALS
HEIGHT: 71 IN | HEART RATE: 99 BPM | BODY MASS INDEX: 31.08 KG/M2 | WEIGHT: 222 LBS | RESPIRATION RATE: 16 BRPM | OXYGEN SATURATION: 97 % | DIASTOLIC BLOOD PRESSURE: 73 MMHG | SYSTOLIC BLOOD PRESSURE: 115 MMHG

## 2024-02-08 DIAGNOSIS — C11.9 NASOPHARYNGEAL CARCINOMA (MULTI): ICD-10-CM

## 2024-02-08 DIAGNOSIS — R09.81 NASAL CONGESTION: ICD-10-CM

## 2024-02-08 DIAGNOSIS — F32.A ANXIETY AND DEPRESSION: ICD-10-CM

## 2024-02-08 DIAGNOSIS — E66.9 OBESITY (BMI 30-39.9): ICD-10-CM

## 2024-02-08 DIAGNOSIS — G47.30 SLEEP-RELATED BREATHING DISORDER: Primary | ICD-10-CM

## 2024-02-08 DIAGNOSIS — K11.7 XEROSTOMIA: ICD-10-CM

## 2024-02-08 DIAGNOSIS — F99 INSOMNIA DUE TO OTHER MENTAL DISORDER: ICD-10-CM

## 2024-02-08 DIAGNOSIS — F51.05 INSOMNIA DUE TO OTHER MENTAL DISORDER: ICD-10-CM

## 2024-02-08 DIAGNOSIS — F41.9 ANXIETY AND DEPRESSION: ICD-10-CM

## 2024-02-08 PROCEDURE — 1036F TOBACCO NON-USER: CPT

## 2024-02-08 PROCEDURE — 99204 OFFICE O/P NEW MOD 45 MIN: CPT

## 2024-02-08 PROCEDURE — 99214 OFFICE O/P EST MOD 30 MIN: CPT

## 2024-02-08 RX ORDER — LEVOTHYROXINE SODIUM 20 UG/ML
100 INJECTION, SOLUTION INTRAVENOUS
COMMUNITY

## 2024-02-08 RX ORDER — BUPROPION HYDROCHLORIDE 150 MG/1
TABLET ORAL
COMMUNITY
Start: 2024-02-06

## 2024-02-08 RX ORDER — SERTRALINE HYDROCHLORIDE 50 MG/1
50 TABLET, FILM COATED ORAL NIGHTLY
COMMUNITY

## 2024-02-08 ASSESSMENT — SLEEP AND FATIGUE QUESTIONNAIRES
HOW LIKELY ARE YOU TO NOD OFF OR FALL ASLEEP WHILE SITTING AND TALKING TO SOMEONE: MODERATE CHANCE OF DOZING
DIFFICULTY_STAYING_ASLEEP: SEVERE
HOW LIKELY ARE YOU TO NOD OFF OR FALL ASLEEP WHEN YOU ARE A PASSENGER IN A CAR FOR AN HOUR WITHOUT A BREAK: MODERATE CHANCE OF DOZING
HOW LIKELY ARE YOU TO NOD OFF OR FALL ASLEEP WHILE LYING DOWN TO REST IN THE AFTERNOON WHEN CIRCUMSTANCES PERMIT: HIGH CHANCE OF DOZING
SLEEP_PROBLEM_NOTICEABLE_TO_OTHERS: MUCH
WAKING_TOO_EARLY: MODERATE
HOW LIKELY ARE YOU TO NOD OFF OR FALL ASLEEP WHILE SITTING AND READING: MODERATE CHANCE OF DOZING
HOW LIKELY ARE YOU TO NOD OFF OR FALL ASLEEP WHILE WATCHING TV: HIGH CHANCE OF DOZING
DIFFICULTY_FALLING_ASLEEP: SEVERE
HOW LIKELY ARE YOU TO NOD OFF OR FALL ASLEEP WHILE SITTING QUIETLY AFTER LUNCH WITHOUT ALCOHOL: SLIGHT CHANCE OF DOZING
HOW LIKELY ARE YOU TO NOD OFF OR FALL ASLEEP IN A CAR, WHILE STOPPED FOR A FEW MINUTES IN TRAFFIC: WOULD NEVER DOZE
SLEEP_PROBLEM_INTERFERES_DAILY_ACTIVITIES: MUCH
ESS-CHAD TOTAL SCORE: 14
SATISFACTION_WITH_CURRENT_SLEEP_PATTERN: VERY DISSATISFIED
WORRIED_DISTRESSED_DUE_TO_SLEEP: VERY MUCH NOTICEABLE
SITING INACTIVE IN A PUBLIC PLACE LIKE A CLASS ROOM OR A MOVIE THEATER: SLIGHT CHANCE OF DOZING

## 2024-03-12 ENCOUNTER — CLINICAL SUPPORT (OUTPATIENT)
Dept: SLEEP MEDICINE | Facility: CLINIC | Age: 24
End: 2024-03-12
Payer: MEDICARE

## 2024-03-12 DIAGNOSIS — G47.10 HYPERSOMNIA, UNSPECIFIED: ICD-10-CM

## 2024-03-12 DIAGNOSIS — G47.30 SLEEP-RELATED BREATHING DISORDER: ICD-10-CM

## 2024-03-12 PROCEDURE — 95810 POLYSOM 6/> YRS 4/> PARAM: CPT | Performed by: INTERNAL MEDICINE

## 2024-03-13 VITALS
HEIGHT: 71 IN | BODY MASS INDEX: 31.17 KG/M2 | SYSTOLIC BLOOD PRESSURE: 115 MMHG | DIASTOLIC BLOOD PRESSURE: 73 MMHG | WEIGHT: 222.66 LBS

## 2024-03-13 ASSESSMENT — SLEEP AND FATIGUE QUESTIONNAIRES
HOW LIKELY ARE YOU TO NOD OFF OR FALL ASLEEP WHILE SITTING AND TALKING TO SOMEONE: WOULD NEVER DOZE
HOW LIKELY ARE YOU TO NOD OFF OR FALL ASLEEP WHILE SITTING AND READING: MODERATE CHANCE OF DOZING
HOW LIKELY ARE YOU TO NOD OFF OR FALL ASLEEP IN A CAR, WHILE STOPPED FOR A FEW MINUTES IN TRAFFIC: WOULD NEVER DOZE
ESS-CHAD TOTAL SCORE: 13
HOW LIKELY ARE YOU TO NOD OFF OR FALL ASLEEP WHEN YOU ARE A PASSENGER IN A CAR FOR AN HOUR WITHOUT A BREAK: MODERATE CHANCE OF DOZING
SITING INACTIVE IN A PUBLIC PLACE LIKE A CLASS ROOM OR A MOVIE THEATER: MODERATE CHANCE OF DOZING
HOW LIKELY ARE YOU TO NOD OFF OR FALL ASLEEP WHILE WATCHING TV: HIGH CHANCE OF DOZING
HOW LIKELY ARE YOU TO NOD OFF OR FALL ASLEEP WHILE SITTING QUIETLY AFTER LUNCH WITHOUT ALCOHOL: SLIGHT CHANCE OF DOZING
HOW LIKELY ARE YOU TO NOD OFF OR FALL ASLEEP WHILE LYING DOWN TO REST IN THE AFTERNOON WHEN CIRCUMSTANCES PERMIT: HIGH CHANCE OF DOZING

## 2024-03-13 NOTE — PROGRESS NOTES
[facility] TECH NOTE:     Patient: Alek Frederick   MRN//AGE: [id number]  2000  23 y.o.   Technologist: Nadiya Toure   Room: 3   Service Date: 3/13/2024        Sleep Testing Location: [facility]:     TECHNOLOGIST SLEEP STUDY PROCEDURE NOTE:   This sleep study is being conducted according to the policies and procedures outlined by the AAS accreditation standards.  The sleep study procedure and processes involved during this appointment was explained to the patient/patient’s family, questions were answered. The patient/family verbalized understanding.      The patient is a 23 y.o. year old male scheduled for a diagnostic PSG  with montage of:  standard . he arrived for his appointment.      The study that was ultimately completed was a diagnostic PSG  with montage of:  standard .    The full study Was completed.  Patient questionnaires completed?: yes     Consents signed? yes    Initial Fall Risk Screening:     Alek has not fallen in the last 6 months. his did not result in injury. Alek does not have a fear of falling. He does not need assistance with sitting, standing, or walking. he does not need assistance walking in his home. he does not need assistance in an unfamiliar setting. The patient is notusing an assistive device.     Brief Study observations: N/A     Deviation to order/protocol and reason: N/A      If PAP, which was preferred mask/pressure/mode: N/A      Other: The patient's father requested an early pick-up time at 4:30 am. Tech will wake patient a little early to accommodate.     After the procedure, the patient/family was informed to ensure followup with ordering clinician for testing results.      Technologist: Nadiya Toure

## 2024-03-20 NOTE — PROGRESS NOTES
[facility]  [address]  [facility]  [address]  [facility] [address]     [facility]  Follow Up Visit Note           Subjective   Patient ID: Alek Frederick is a 23 y.o. male with past medical history significant for Obesity, Sleep disturbance, ADHD, Nasal pharyngeal carcinoma, and Obstructive sleep apnea.    3/28/24: UPDATED: The patient returned to the clinic with his parent to review his sleep study, which was completed on 3/12/24 and revealed mild Obstructive sleep apnea. However, his insurance uses CMS criteria and cannot provide him with a pap. However, he is very symptomatic, so  I offered him an N30 I mask to try in the clinic, and he reports being comfortable. He will come back when he obtains a CPAP online or someone else. His  ESS is 11 today.      2/8/24: The patient is here accompanied by his mother,  who adds to his history and referred by CORY Hartman-LEXIE, for comprehensive sleep medicine evaluation due to suspected sleep apnea. He has symptoms of excessive daytime sleepiness and tiredness and does not get up to eat dinner.  Mom works from 7 AM-4 PM. His  ESS is 14, and JAYJAY is 22  today.     HPI  Patient had been having these symptoms for the past 2-3 years.   Patient never had sleep study done yet.         SLEEP STUDY HISTORY: (personally reviewed raw data such as interpretation report, data sheet, hypnogram, and titration table if available and applicable)  3/12/24: PSG: RDI3%: 8.4/hr,RDI 4%: 0.7/hr, <88%: 0 minutes, Aly: 95%     SLEEP-WAKE SCHEDULE  Bedtime: 9-11 PM on weekdays, same on weekends  Subjective sleep latency: 30 minutes or more  Problems falling asleep: Y  Number of awakenings: 1-2 times per night spontaneously for unknown reasons  Falls back asleep in 20-30 minutes  Problems staying asleep: Y  Final wake time: 3-5 AM on weekdays, same on weekends  Out of  bed time: 5 AM on weekdays, same on weekends  Shift work: N  Naps: Yes  Feels rested after a nap: No   Average sleep duration (excluding naps): 6-8 hours     SLEEP ENVIRONMENT  Sleep location: bed  Sleep status: sleeps alone  Room is dark:  Yes  Room is quiet: Yes  Room is cool: Yes  Bed comfort: good     SLEEP HABITS:   Activities before bedtime: play cell phone  Activities in bed: play cell phone  Preferred sleep position: right side     SLEEP ROS:  Night symptoms: unknown  Morning symptoms: POSITIVE for unrefreshing sleep and morning dry mouth  Daytime symptoms POSITIVE for excessive daytime sleepiness and fatigue  Hypersomnia / narcolepsy symptoms: Patient denies symptoms of a hypersomnolence disorder such as sleep paralysis, sleep-related hallucinations, recurrent sleep attacks, automatic behaviors, and cataplexy.   RLS symptoms: Patient denies RLS symptoms.  Movements in sleep: Patient denies problematic movements in sleep,   Parasomnia symptoms: Patient denies symptoms of parasomnia.     WEIGHT: stable     REVIEW OF SYSTEMS: All other systems have been reviewed and are negative.     PERTINENT SOCIAL HISTORY:  Occupation: No  Smoking: No   ETOH: No   Marijuana: No   Caffeine: Yes  Sleep aids: No   Claustrophobia: No      PERTINENT PAST SURGICAL HISTORY:  no     PERTINENT FAMILY HISTORY:  sleep apnea- uncle     Active Problems, Allergy List, Medication List, and PMH/PSH/FH/Social Hx have been reviewed and reconciled in chart. No significant changes unless documented in the pertinent chart section. Updates made when necessary.         Objective   Vitals:    03/28/24 1130   BP: 102/69   Pulse: 55   Resp: 16   Temp: 34.9 °C (94.8 °F)   SpO2: 99%       Physical Exam  Constitutional:alert and oriented to time, place, and person  Lungs: Clear to auscultation bilateral, no rales  Heart: Regular rate and rhythm, no murmurs           Assessment/Plan   Alek Frederick is a 23 y.o. male who is seen to evaluate for  possible obstructive sleep apnea. The pathophysiology of sleep apnea, diagnostic testing (HST vs PSG), treatment options (PAP, oral appliance, surgery, hypoglossal nerve stimulator called Inspire), and supportive management (weight loss, positional therapy, smoking cessation, avoidance of alcohol and sedatives) were discussed with the patient in detail. Risk factors of sleep apnea as well as cardiometabolic and neurocognitive sequelae associated with untreated sleep apnea were also discussed. Lastly, patient was advised to avoid driving vehicle or operating heavy machinery when sleepy.      Alek Frederick with the following problems:  # OBSTRUCTIVE SLEEP APNEA: RDI3%: 8.4/hr,RDI 4%: 0.7/hr,  -Per CMS criteria, the patient did not qualify for a new machine. Therefore, the patient will obtain a pap online or from someone else.   -Provide a free sample of N30 to try in the clinic. The patient reports being comfortable.   -Start [id number] auto CPAP when obtained a pap.  -Sleep apnea and PAP therapy education were provided at length in the clinic today. Alek Frederick verbalized understanding.  -Emphasized diet, exercise, and weight loss in the clinic, as were non-supine sleep, avoiding alcohol in the late evening, and driving or operating heavy machinery when sleepy.  -Alek Frederickverbalizes understanding of the above instructions and risks.     # CHRONIC SLEEP ONSET/ SLEEP MAINTENANCE INSOMNIA:  -likely due to poor sleep hygiene, and  untreated sleep apnea.   -JAYJAY:23     # DEPRESSION and ANXIETY:   -Alek Frederickis taking Bupropion, and Sertraline well.  -Denies HI/SI     # OBESITY:  -with a BMI of 32.43. Alek Frederick most recent Bicarbonate was 32 in Jan,2024.  -Encourage to have regular exercise to manage weight well.     # NASAL CONGESTION:  -F/U with ENT.  -Instruct Alek Douglas use appropriate Flonase spray to ease congestion.     # XEROSTOMIA:  -Instruct Alek Douglas purchase the Biotene gel  to ease the dry mouth symptom.        RTC ASAP when got machine        All of patient's questions were answered. He verbalizes understanding and agreement with my assessment and plan.

## 2024-03-20 NOTE — PATIENT INSTRUCTIONS
[facility] Sleep Medicine  [address]  [facility]  [address]  [facility] [address]       Thank you for coming to the Sleep Medicine Clinic today! Your sleep medicine provider today was: LEONARD Martin Below is a summary of your treatment plan, patient education, other important information, and our contact numbers.    Dear Alek Frederick,    Thank you for visiting  Sleep Medicine Clinic !     1. According to your symptoms and sleep study report, I order a new PAP device for you to control your sleep apnea. However, your insurance won't pay for the pap. Please find a pap and bring it to the clinic, and I can program it for you.    2. Please do not drive when you are sleepy and  start practicing the sleep hygiene  as discussed in clinic today.     3. Please continue practicing the appropriate nasal spray at night to ease your nasal congestion as discussed in clinic today if needed.    4. FOR QUESTIONS AND CONCERNS:    Please call my office with issues or questions: [phone] ([place]); [phone] ([facility])    If you have a CPAP or BiPAP machine at home, please bring the unit and all accessories including the power cord to your appointments unless I tell you otherwise. Please have knowledge of the DME company you worked with to receive your PAP device. If you have copies of any previous sleep testing completed outside of , please bring with you to clinic as well. This information will make our visits more productive.     If you are new to CPAP or BiPAP, please note the minimum usage insurance requires to continuing coverage for the equipment as noted by your DME company. Please discuss equipment issues (PAP unit, mask fit, humidification, etc.) with your DME company first.       In the event that you are running more than 10 minutes late to your appointment, I will kindly ask you to reschedule. Thanks.      TREATMENT PLAN      Follow-up Appointment:   ASAP    PATIENT EDUCATION     Obstructive Sleep Apnea (OSWALDO) is a sleep disorder where your upper airway muscles relax during sleep and the airway intermittently and repetitively narrows and collapses leading to partially blocked airway (hypopnea) or completely blocked airway (apnea) which, in turn, can disrupt breathing in sleep, lower oxygen levels while you sleep and cause night time wakings. Because both apnea and hypopnea may cause higher carbon dioxide or low oxygen levels, untreated OSWALDO can lead to heart arrhythmia, elevation of blood pressure, and make it harder for the body to consolidate memory and facilitate metabolism (leading to higher blood sugars at night). Frequent partial arousals occur during sleep resulting in sleep deprivation and daytime sleepiness. OSWALDO is associated with an increased risk of cardiovascular disease, stroke, hypertension, and insulin resistance. Moreover, untreated OSWALDO with excessive daytime sleepiness can increase the risk of motor vehicular accidents.    Some conservative strategies for OSWALDO regardless of OSWALDO severity are:   Positional therapy - Avoid sleeping on your back.   Healthy diet and regular exercise to optimize weight is highly encouraged.   Avoid alcohol late in the evening and sedative-hypnotics as these substances can make sleep apnea worse.   Improve breathing through the nose with intranasal steroid spray, saline rinse, or antihistamines    Safety: Avoid driving vehicle and operating heavy equipment while sleepy. Drowsy driving may lead to life-threatening motor vehicle accidents. A person driving while sleepy is 5 times more likely to have an accident. If you feel sleepy, pull over and take a short power nap (sleep for less than 30 minutes). Otherwise, ask somebody to drive you.    Treatment options for sleep apnea include weight management, positional therapy, Positive Airway Therapy (PAP) therapy, oral appliance therapy, hypoglossal  nerve stimulator (Inspire) and select airway surgeries.    Starting Positive Airway Pressure (PAP): You were ordered a device to wear when you sleep called PAP (Positive Airway Pressure) to treat your sleep apnea. The order will be submitted to a durable medical equipment (DME) company who will arrange setting you up with the device. They will provide all the necessary equipment and discuss use and maintenance of the device with you as well as mask fitting and process of replacing / renewing PAP supplies or accessories. Once you get the machine, please start using it immediately. You may not be successful right away and that is okay. William be certain that you keep trying nightly and reach out to DME if you are struggling or having issues with machine usage.     *Please follow-up with me in 1-2 months of starting CPAP to see how well it is working for you and to do some troubleshooting if needed. Also, please bring all PAP equipment with you to follow up appointments unless told otherwise.     Important things to keep in mind as you start PAP:  Insurance will monitor your usage during the first 90 days. You should use your PAP - all night, every night, and including all naps (especially if naps are more than 30 minutes) for your health. The bare minimum is to use your PAP device while sleeping for at least 4 hours per day at least 5-6 days per week.. Otherwise, your PAP device will be reclaimed by your DME company at 90 days.  There are many masks to choose from to wear with your PAP machine. If you are not comfortable with the first mask issued to you, call your DME company and ask for another option to try. You typically have a 30-day mask guarantee from the day you received your machine.   Discuss with your provider if you are having issues breathing with the machine or if the temperature or humidity feel uncomfortable.  Expect to have an adjustment period when you start your device. It helps to continuing wearing  the machine every day for a period of time until you get more used to it. You can practice with wearing the mask alone if you need, then add in the PAP air pressure a few days later.   Reach out for help if you are struggling! The sleep medicine department can be reached at [phone]  We encourage you to download data monitoring apps to your phone. For Vaporese 10/11 - MyAir faizan. For NOZA - DreamMapper. Both apps are available in the Faiz[employer] store for free and are a great tool to monitor your progress with your PAP device night to night.    Tips for success with PAP machine usage:  Comfortable and well-fitting mask  Appropriate pressure on the machine  Using humidification  Support from bed partner and clinical team      Maintaining your CPAP/BPAP device:    The humidification chamber (aka water tank or water chamber) needs to be filled with distilled water to prevent buildup of white deposits in the future. If you cannot find distilled water, you can use tap water but expect to have white deposits buildup seen after prolonged use with tap water. If you start seeing white deposits on the water chamber, you can clean it by filling it with equal parts of distilled white vinegar and water. Let the vinegar-water mixture sit for 2 hours, and then rinse it with running tap water. Clean with soap and water then let it dry.     You should try to keep your machine clean in order to work well. Here are some tips to clean PAP supplies / accessories:    Clean the humidification chamber (aka water tank) as well as your mask and tubing at least once a week with soap and water.   Alternatively, you can fill a sink or basin with warm water and add a little mild detergent, like Ivory dish soap. Gently wipe your supplies with the soapy water to free all the oils and dirt that may have collected. Once that's done, rinse these items with clean water until the soap is gone and let them air dry. You can hang  your tubing over the curtain berna in your bathroom so that it dries.  The mask insert (part of the mask that has contact with your skin) needs to be cleaned with soap and water daily. Another option is to wipe them down with CPAP wipes or baby wipes.    You should replace your mask and tubing frequently in order to prevent bacteria buildup, machine damage, and mask seal issues. The older the mask and hose, the high likelihood that there is bacteria buildup in it especially if they are not cleaned regularly. Dirty filters damage machines because build-up of dust and contaminants can cause machine to over-heat, and in time, damage the motor of machine. Cushions lose their seal over time as most masks are made of plastic and silicone while headgear is made of neoprene. These materials will break down with age and frequent use. Here is the recommended replacement schedule for PAP supplies / accessories:    Twice a month- disposable filters and cushions for nasal mask or nasal pillows.  Once a month- cushion for full face mask  Every 3 months- mask with headgear and PAP tubing (standard or heated hose)  Every 6 months- reusable filter, water chamber, and chin strap     Other useful information:    Some people do not put water in the tank while other people prefers to put water in the tank to prevent mouth dryness. Try to experiment to determine which is more comfortable for you.   In general, new machines have 2 years warranty on parts while health insurance allows you to have a new machine once every 5 years.     Common issues with PAP machine:    Mask gets dislodged when turning to the side: Consider getting a CPAP pillow or switching to a mask with hose on top.     Dry mouth:  Your machine has built-in humidifier that heats up the air to prevent dry mouth. It can be adjusted to your comfort. You can try that first and increase setting one level one night at a time to check which setting is comfortable and effective in  "lessening dry mouth. In some patients with heated hose, adjusting tube temperature to make air warmer can improve dry mouth. If dry mouth persists despite adjusting humidity or tube temperature setting, may apply OTC Biotene gel over the gums at bedtime.  If Biotene gel is not effective, consider trying XEROSTOM gel from [employer].  Also, eliminate or reduce dose of medications that can cause dry mouth if possible. Lastly, may try getting a separate room humidifier machine.    Airleaks: Please call DME as they may need to adjust your mask or refit you with a different kind or different size of mask. In addition, you can ask DME for tips on getting a good mask seal and mask fit.     Difficulty tolerating the mask: Contact your DME to try a different kind of mask and/or call office to get a referral to Sleep Psychologist for CPAP desensitization. CPAP desensitization technique is a set of strategies that helps patient cope with claustrophobia and anxiety related to wearing mask. Alternatively, we can do a daytime mini-sleep study called PAP-nap trial wherein you will try on different kinds of mask and the sleep technician will try different pressure settings on CPAP and BPAP machines to see which specific pressure is tolerable and comfortable for you.     Water droplets or moisture within the hose and/or mask: This is called rain-out and it is caused by condensation of too much heated humidity on the cooler walls of the hose. If you have rain-out, turn down humidity settings or get a heated hose. If you already have a heated hose, turn up the \"tube temperature\" of the heated hose. Alternatively, if you don't want to get a heated hose or warmer air, may wrap the CPAP hose with stockings to keep it somewhat warm. Also, you need to place the machine on the floor and lower the hose so that water won't travel upward towards your mask.     PAP desensitization techniques: If you have concerns about something being on your " face at night, you can start by getting used to it before trying to sleep with it as follows:      Sit in a comfortable chair or bed. Connect the mask and hose to the CPAP/BiPAP machine. Hold the mask on your face (without straps on) and turn on the machine. Practice breathing with the mask on while awake sitting and watching television, reading, or performing a sedentary activity during the day for 5-10 minutes and then take it off.  If tolerated, try again and gradually build up to longer periods of time. If not tolerated, try and try again until it is more comfortable as you become more desensitized. If you are able to use it for at least 20-30 minutes, move unto the next step.     Sit in a comfortable chair or bed. Connect the mask and hose to the CPAP/BiPAP machine. Strap the mask on your head and turn on the machine. Practice breathing with the mask and headgear on while awake sitting and watching television, reading, or performing a sedentary activity. Start with 5-10 minutes and gradually increasing time until you can wear it comfortably for at least 20-30 minutes, then move to the next step.    Take a shorter daytime nap with machine turned on while you are in a reclined position in bed, sofa, or recliner. Start with 5-10 minute nap and gradually increase up to 30 minutes. It is not important whether you fall asleep or not. The goal is to rest comfortably with PAP machine on.     Reintroduce PAP machine into nighttime sleep. You can begin using it a portion of the night and gradually increase up to entire night.     Proceed from one step to the next only when you are completely comfortable. If you feel any anxiety or discomfort, return to the previous step, then proceed again when comfortable.    Expect to “work” with your CPAP/BIPAP unit. It is important to try to relax when beginning CPAP/BIPAP therapy. Inhalation and exhalation should occur through the nose only. If you are unable to consistently breathe  this way, do not panic or lose hope. There are other types of masks which allow you to breathe through your nose and/or your mouth. Also, in some patients, using intranasal steroid spray (e.g. Flonase or Nasocort or Fluticasone) 1 hour before bedtime and/or before putting on CPAP mask can help tolerate breathing through the mask.    Don't give up after a few attempts--some patients adjust quickly, while some patients need 3-4 weeks (or sometimes even longer) to be accustomed to CPAP therapy.  Contact your sleep medicine specialist if you have a significant change in weight since this may affect your pressure.    How to use nasal sprays properly: If you have nasal congestion or allergies, improving your breathing through the nose is critical for treating sleep apnea and improving your sleep. Hence, intranasal steroid spray like Flonase or Nasocort (generic name is Fluticasone) might help. In some patients with sleep apnea, using intranasal steroid spray allowed them to tolerate CPAP mask better.     Intranasal steroids are usually prescribed as 2 sprays per nostril 1 hour before bedtime. If you administer intranasal steroids too close to bedtime, it might not work as well.  If you have nasal congestion or allergies during day despite using Flonase, try adding Azelastine nasal spray 2 sprays per nostril in the morning. Alternatively, can consider adding over-the-counter non-sedating antihistamine medications.     However, if you have severe nasal congestion or allergies despite using both nasal sprays, consider seeing an allergy specialist to confirm which substance you are sensitive to and also get definitive treatment which may be in the form of injections, oral pills, different kind of nose sprays, and/or a combination of everything said.     Below are instructions on how to use intranasal steroid spray properly:  Sit up or stand up with your face down.  Shake the spray bottle and insert its tip in one  nostril.  Point the spray towards your ear, and not towards the middle of your nose. Pointing the tip upward and in the middle of the nose can lead to discomfort and irritation of nasal mucosa which can lead to nose bleeding or coughing up tinges of blood.  Squeeze the spray bottle and administer the recommended amount of spray.   Don't sniff when you spray. Remove the spray bottle.  Pinch your nose and hold it for a count of 10.   Perform steps 1-6 on the other nostril.    You can also go to the following EDUCATION WEBSITES for further information:   American Academy of Sleep Medicine http://sleepeducation.org  National Sleep Foundation: https://sleepfoundation.org  American Sleep Apnea Association: https://www.sleepapnea.org (for patients with sleep apnea)  Narcolepsy Network: https://www.narcolepsynetwork.org (for patients with narcolepsy)  [employer]: https://www.cVidyalepsy.org (for patients with narcolepsy)  Hypersomnia Foundation: https://www.hypersomniafoundation.org (for patients with idiopathic hypersomnia)  RLS foundation: https://www.rls.org (for patients with restless leg syndrome)    IMPORTANT INFORMATION     Call 911 for medical emergencies.  Our offices are generally open from Monday-Friday, 8 am - 5 pm.   There are no supporting services by either the sleep doctors or their staff on weekends and Holidays, or after 5 PM on weekdays.   If you need to get in touch with me, you may either call my office number or you can use SingOn.  If a referral for a test, for CPAP, or for another specialist was made, and you have not heard about scheduling this within a week, please call scheduling at [phone] ([phone]).  If you are unable to make your appointment for clinic or an overnight study, kindly call the office or sleep testing center at least 48 hours in advance to cancel and reschedule.  If you are on CPAP, please bring your device's card and/or the device to each clinic appointment.   In  case of problems with PAP machine or mask interface, please contact your DME (Durable Medical Equipment) company first. DME is the company who provides you the machine and/or PAP supplies.       PRESCRIPTIONS     We require 7 days advanced notice for prescription refills. If we do not receive the request in this time, we cannot guarantee that your medication will be refilled in time.    IMPORTANT PHONE NUMBERS     Sleep Medicine Clinic Fax: [phone]  Appointments (for Pediatric Sleep Clinic): [phone] - option 1  Appointments (for Adult Sleep Clinic): [phone] - option 2  Appointments (For Sleep Studies): [phone] - option 3  Behavioral Sleep Medicine: [phone]  Sleep Surgery: [phone]  Nutrition Service: [phone]  Weight management clinics with endocrinology: [phone]  Bariatric Services: [phone] (includes weight loss medications and weight loss surgery)  [facility]: [phone] (offers holistic approaches to weight management)  ENT (Otolaryngology): [phone]  Headache Clinic (Neurology): [phone]  Neurology: [phone]  Psychiatry: [phone]  Pulmonary Function Testing (PFT) Center: [phone]  Pulmonary Medicine: [phone]  [employer] (DME): [phone]      OUR SLEEP TESTING LOCATIONS     Our team will contact you to schedule your sleep study, however, you can contact us as follow:  Main Phone Line (scheduling only): [phone] ([phone]), option 3  Adult and Pediatric Locations  [facility] (6 years and older): [facility] - 4th floor ([address] [place] [address]) After hours line: [phone]  [facility] (Main campus: All ages): [facility], 6th floor. After hours line: [phone]  [facility] (5 years and older; younger considered on case-by-case basis): [address] [facility]; [address], [address]. Scheduling  After hours line:  "[phone]   [place] (6 years and older): [address]; [address]; [address]   [place] (6 years and older): [address], [address]  After hours line: [phone]   [place] (13 years and older) in [place]: [address], 2nd floor  After hours line: [phone]   [place] (13 year and older): [address]  After hours line: [phone]     Adult Only Locations:   [place] (18 years and older): [address] [facility], 2nd floor   [place] (18 years and older): [address] [facility]; 4th floor  After hours line: [phone]  [facility] (18 years and older) at [place]: [id number] [place] [address]  After hours line: [phone]     CONTACTING YOUR SLEEP MEDICINE PROVIDER AND SLEEP TEAM      For issues with your machine or mask interface, please call your DME provider first. DME stands for durable medical company. DME is the company who provides you the machine and/or PAP supplies / accessories.   To schedule, cancel, or reschedule SLEEP STUDY APPOINTMENTS, please call the Main Phone Line at [phone] - option 3.   To schedule, cancel, or reschedule CLINIC APPOINTMENTS, you can do it in \"MyChart\", call [phone] to speak with my  (Cici Gonzalez), or call the Main Phone Line at [phone]) - option 2  For CLINICAL QUESTIONS or MEDICATION REFILLS, please call direct line for Adult Sleep Nurses at [phone].   Lastly, you can also send a message directly to your provider through \"My Chart\", which is the email service through your  Records Account: [email or web address]       Here at [facility], we wish you a restful sleep!   "

## 2024-03-28 ENCOUNTER — OFFICE VISIT (OUTPATIENT)
Dept: SLEEP MEDICINE | Facility: CLINIC | Age: 24
End: 2024-03-28
Payer: MEDICARE

## 2024-03-28 VITALS
HEIGHT: 70 IN | BODY MASS INDEX: 32.35 KG/M2 | TEMPERATURE: 94.8 F | WEIGHT: 226 LBS | DIASTOLIC BLOOD PRESSURE: 69 MMHG | SYSTOLIC BLOOD PRESSURE: 102 MMHG | OXYGEN SATURATION: 99 % | RESPIRATION RATE: 16 BRPM | HEART RATE: 55 BPM

## 2024-03-28 DIAGNOSIS — F41.9 ANXIETY AND DEPRESSION: ICD-10-CM

## 2024-03-28 DIAGNOSIS — F32.A ANXIETY AND DEPRESSION: ICD-10-CM

## 2024-03-28 DIAGNOSIS — G47.30 SLEEP-RELATED BREATHING DISORDER: ICD-10-CM

## 2024-03-28 DIAGNOSIS — G47.33 OSA (OBSTRUCTIVE SLEEP APNEA): Primary | ICD-10-CM

## 2024-03-28 PROCEDURE — 99214 OFFICE O/P EST MOD 30 MIN: CPT

## 2024-03-28 PROCEDURE — 1036F TOBACCO NON-USER: CPT

## 2024-03-28 ASSESSMENT — SLEEP AND FATIGUE QUESTIONNAIRES
HOW LIKELY ARE YOU TO NOD OFF OR FALL ASLEEP WHILE LYING DOWN TO REST IN THE AFTERNOON WHEN CIRCUMSTANCES PERMIT: HIGH CHANCE OF DOZING
HOW LIKELY ARE YOU TO NOD OFF OR FALL ASLEEP WHILE WATCHING TV: MODERATE CHANCE OF DOZING
HOW LIKELY ARE YOU TO NOD OFF OR FALL ASLEEP WHILE SITTING AND TALKING TO SOMEONE: WOULD NEVER DOZE
SITING INACTIVE IN A PUBLIC PLACE LIKE A CLASS ROOM OR A MOVIE THEATER: SLIGHT CHANCE OF DOZING
ESS-CHAD TOTAL SCORE: 11
HOW LIKELY ARE YOU TO NOD OFF OR FALL ASLEEP WHEN YOU ARE A PASSENGER IN A CAR FOR AN HOUR WITHOUT A BREAK: SLIGHT CHANCE OF DOZING
HOW LIKELY ARE YOU TO NOD OFF OR FALL ASLEEP WHILE SITTING QUIETLY AFTER LUNCH WITHOUT ALCOHOL: MODERATE CHANCE OF DOZING
HOW LIKELY ARE YOU TO NOD OFF OR FALL ASLEEP WHILE SITTING AND READING: MODERATE CHANCE OF DOZING
HOW LIKELY ARE YOU TO NOD OFF OR FALL ASLEEP IN A CAR, WHILE STOPPED FOR A FEW MINUTES IN TRAFFIC: WOULD NEVER DOZE

## 2024-08-21 ENCOUNTER — HOSPITAL ENCOUNTER (OUTPATIENT)
Dept: PEDIATRIC HEMATOLOGY/ONCOLOGY | Facility: HOSPITAL | Age: 24
Discharge: HOME | End: 2024-08-21
Payer: MEDICARE

## 2024-08-21 ENCOUNTER — HOSPITAL ENCOUNTER (OUTPATIENT)
Dept: RADIOLOGY | Facility: HOSPITAL | Age: 24
Discharge: HOME | End: 2024-08-21
Payer: MEDICARE

## 2024-08-21 VITALS
HEIGHT: 70 IN | TEMPERATURE: 98.5 F | WEIGHT: 249.56 LBS | DIASTOLIC BLOOD PRESSURE: 81 MMHG | BODY MASS INDEX: 35.73 KG/M2 | RESPIRATION RATE: 18 BRPM | HEART RATE: 73 BPM | SYSTOLIC BLOOD PRESSURE: 120 MMHG

## 2024-08-21 DIAGNOSIS — Z13.31 DEPRESSION SCREENING: ICD-10-CM

## 2024-08-21 DIAGNOSIS — C11.9 NASOPHARYNGEAL CARCINOMA (MULTI): ICD-10-CM

## 2024-08-21 DIAGNOSIS — F84.0 AUTISM SPECTRUM DISORDER (HHS-HCC): ICD-10-CM

## 2024-08-21 DIAGNOSIS — F41.1 GAD (GENERALIZED ANXIETY DISORDER): ICD-10-CM

## 2024-08-21 DIAGNOSIS — R51.9 NONINTRACTABLE HEADACHE, UNSPECIFIED CHRONICITY PATTERN, UNSPECIFIED HEADACHE TYPE: Primary | ICD-10-CM

## 2024-08-21 DIAGNOSIS — F90.2 ATTENTION DEFICIT HYPERACTIVITY DISORDER (ADHD), COMBINED TYPE, MODERATE: Primary | ICD-10-CM

## 2024-08-21 DIAGNOSIS — F33.1 MDD (MAJOR DEPRESSIVE DISORDER), RECURRENT EPISODE, MODERATE (MULTI): ICD-10-CM

## 2024-08-21 LAB
25(OH)D3 SERPL-MCNC: 53 NG/ML (ref 30–100)
ALBUMIN SERPL BCP-MCNC: 4.6 G/DL (ref 3.4–5)
ALP SERPL-CCNC: 80 U/L (ref 33–120)
ALT SERPL W P-5'-P-CCNC: 27 U/L (ref 10–52)
ANION GAP SERPL CALC-SCNC: 13 MMOL/L (ref 10–20)
APPEARANCE UR: CLEAR
AST SERPL W P-5'-P-CCNC: 23 U/L (ref 9–39)
BASOPHILS # BLD AUTO: 0.03 X10*3/UL (ref 0–0.1)
BASOPHILS NFR BLD AUTO: 0.4 %
BILIRUB DIRECT SERPL-MCNC: 0.1 MG/DL (ref 0–0.3)
BILIRUB SERPL-MCNC: 0.5 MG/DL (ref 0–1.2)
BILIRUB UR STRIP.AUTO-MCNC: NEGATIVE MG/DL
BUN SERPL-MCNC: 15 MG/DL (ref 6–23)
CALCIUM SERPL-MCNC: 9.9 MG/DL (ref 8.6–10.6)
CHLORIDE SERPL-SCNC: 101 MMOL/L (ref 98–107)
CO2 SERPL-SCNC: 28 MMOL/L (ref 21–32)
COLOR UR: YELLOW
CREAT SERPL-MCNC: 1.06 MG/DL (ref 0.5–1.3)
EGFRCR SERPLBLD CKD-EPI 2021: >90 ML/MIN/1.73M*2
EOSINOPHIL # BLD AUTO: 0.35 X10*3/UL (ref 0–0.7)
EOSINOPHIL NFR BLD AUTO: 4.4 %
ERYTHROCYTE [DISTWIDTH] IN BLOOD BY AUTOMATED COUNT: 12.4 % (ref 11.5–14.5)
GLUCOSE SERPL-MCNC: 82 MG/DL (ref 74–99)
GLUCOSE UR STRIP.AUTO-MCNC: ABNORMAL MG/DL
HCT VFR BLD AUTO: 42.5 % (ref 41–52)
HGB BLD-MCNC: 15 G/DL (ref 13.5–17.5)
HYALINE CASTS #/AREA URNS AUTO: ABNORMAL /LPF
IMM GRANULOCYTES # BLD AUTO: 0.03 X10*3/UL (ref 0–0.7)
IMM GRANULOCYTES NFR BLD AUTO: 0.4 % (ref 0–0.9)
KETONES UR STRIP.AUTO-MCNC: NEGATIVE MG/DL
LEUKOCYTE ESTERASE UR QL STRIP.AUTO: NEGATIVE
LYMPHOCYTES # BLD AUTO: 1.69 X10*3/UL (ref 1.2–4.8)
LYMPHOCYTES NFR BLD AUTO: 21.1 %
MCH RBC QN AUTO: 28.8 PG (ref 26–34)
MCHC RBC AUTO-ENTMCNC: 35.3 G/DL (ref 32–36)
MCV RBC AUTO: 82 FL (ref 80–100)
MONOCYTES # BLD AUTO: 0.74 X10*3/UL (ref 0.1–1)
MONOCYTES NFR BLD AUTO: 9.2 %
MUCOUS THREADS #/AREA URNS AUTO: ABNORMAL /LPF
NEUTROPHILS # BLD AUTO: 5.17 X10*3/UL (ref 1.2–7.7)
NEUTROPHILS NFR BLD AUTO: 64.5 %
NITRITE UR QL STRIP.AUTO: NEGATIVE
NRBC BLD-RTO: 0 /100 WBCS (ref 0–0)
PH UR STRIP.AUTO: 5.5 [PH]
PHOSPHATE SERPL-MCNC: 2.4 MG/DL (ref 2.5–4.9)
PLATELET # BLD AUTO: 231 X10*3/UL (ref 150–450)
POTASSIUM SERPL-SCNC: 3.7 MMOL/L (ref 3.5–5.3)
PROT SERPL-MCNC: 7.4 G/DL (ref 6.4–8.2)
PROT UR STRIP.AUTO-MCNC: ABNORMAL MG/DL
RBC # BLD AUTO: 5.2 X10*6/UL (ref 4.5–5.9)
RBC # UR STRIP.AUTO: NEGATIVE /UL
RBC #/AREA URNS AUTO: ABNORMAL /HPF
SODIUM SERPL-SCNC: 138 MMOL/L (ref 136–145)
SP GR UR STRIP.AUTO: 1.03
T4 FREE SERPL-MCNC: 1.24 NG/DL (ref 0.78–1.48)
TSH SERPL-ACNC: 4.75 MIU/L (ref 0.44–3.98)
UROBILINOGEN UR STRIP.AUTO-MCNC: NORMAL MG/DL
WBC # BLD AUTO: 8 X10*3/UL (ref 4.4–11.3)
WBC #/AREA URNS AUTO: ABNORMAL /HPF

## 2024-08-21 PROCEDURE — 81001 URINALYSIS AUTO W/SCOPE: CPT | Performed by: PEDIATRICS

## 2024-08-21 PROCEDURE — 84443 ASSAY THYROID STIM HORMONE: CPT | Performed by: PEDIATRICS

## 2024-08-21 PROCEDURE — 84439 ASSAY OF FREE THYROXINE: CPT | Performed by: PEDIATRICS

## 2024-08-21 PROCEDURE — 99215 OFFICE O/P EST HI 40 MIN: CPT | Performed by: PEDIATRICS

## 2024-08-21 PROCEDURE — 36415 COLL VENOUS BLD VENIPUNCTURE: CPT | Performed by: PEDIATRICS

## 2024-08-21 PROCEDURE — 85025 COMPLETE CBC W/AUTO DIFF WBC: CPT | Performed by: PEDIATRICS

## 2024-08-21 PROCEDURE — 80048 BASIC METABOLIC PNL TOTAL CA: CPT | Performed by: PEDIATRICS

## 2024-08-21 PROCEDURE — 76536 US EXAM OF HEAD AND NECK: CPT | Performed by: STUDENT IN AN ORGANIZED HEALTH CARE EDUCATION/TRAINING PROGRAM

## 2024-08-21 PROCEDURE — 99213 OFFICE O/P EST LOW 20 MIN: CPT | Performed by: NURSE PRACTITIONER

## 2024-08-21 PROCEDURE — 84075 ASSAY ALKALINE PHOSPHATASE: CPT | Performed by: PEDIATRICS

## 2024-08-21 PROCEDURE — 84100 ASSAY OF PHOSPHORUS: CPT | Performed by: PEDIATRICS

## 2024-08-21 PROCEDURE — 82306 VITAMIN D 25 HYDROXY: CPT | Performed by: PEDIATRICS

## 2024-08-21 PROCEDURE — 76536 US EXAM OF HEAD AND NECK: CPT

## 2024-08-21 ASSESSMENT — PAIN SCALES - GENERAL: PAINLEVEL: 0-NO PAIN

## 2024-08-21 ASSESSMENT — ENCOUNTER SYMPTOMS
DEPRESSION: 0
OCCASIONAL FEELINGS OF UNSTEADINESS: 0
LOSS OF SENSATION IN FEET: 0

## 2024-08-21 NOTE — PROGRESS NOTES
Office Visit Note    Subjective:  Alek Frederick is a 24 y.o. 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 Alek endorses ongoing sinus issues, chronic drainage and infections in the ears and sinus tracts. Follows with Dr Jeet CELESTE in [place], currently on antibiotic course (cannot name) and nasal spray. ENT concerned for eustachian tube dysfunction. Alek also complains of difficulty swallowing solids, especially tough and dry objects. Hard time generating saliva. Denies feeling of food getting stuck in the chest, heartburn, nausea/vomiting. Endorses sporadic headaches for the past few months. Pain is located above and behind the right eye with radiation to the right temple.    Oncologic history:  Diagnosed 3/12/19 with Nasopharyngeal carcinoma, with b/l cervical lymph node involvement with no distant metastasis , T2 N2 M0 - Stage 3 per AJCC staging,  treated as per ARAR 0331. Diagnosed with sinus biopsy 4/4/19, subsequent lymph node biopsy also positive for disease. History of gtube.  Underwent radiation   5FU 87488cx/m2 and cisplatin 440 mg/m2  He received radiation from 7/29/2019 to 9/17/2019 and received a total dose of 70 Gy in 35 fractions to nasopharyngeal area.     Review of Systems   Constitutional: Negative.    HENT:   Positive for trouble swallowing.         Attributes swallowing issue to dry mouth also does not currently have dentures that fit correctly.  Has chronic sinusitis.    Eyes: Negative.    Respiratory: Negative.          Has OSWALDO but does not qualify for CPAP right now.    Cardiovascular: Negative.    Gastrointestinal: Negative.    Endocrine:        On levothyroxine   Genitourinary: Negative.     Musculoskeletal: Negative.    Skin: Negative.    Neurological:  Positive for headaches.   Hematological: Negative.    Psychiatric/Behavioral:  "Negative.            8/4/2021     7:55 AM 8/4/2021     8:21 AM 1/5/2024    11:23 AM 2/8/2024     9:10 AM 3/13/2024     3:08 AM 3/28/2024    11:30 AM 8/21/2024    11:43 AM   Vitals   Systolic   113 115 115 102 120   Diastolic   74 73 73 69 81   Heart Rate   113 99  55 73   Temp   36.5 °C (97.7 °F)   34.9 °C (94.8 °F) 36.9 °C (98.5 °F)   Resp   20 16  16 18   Height (in) 1.812 m (5' 11.34\")  1.764 m (5' 9.45\") 1.803 m (5' 11\") 1.803 m (5' 10.98\") 1.778 m (5' 10\") 1.774 m (5' 9.84\")   Weight (lb)  221.12 219.36 222 222.66 226 249.56   BMI 28.54 kg/m2 30.55 kg/m2 31.98 kg/m2 30.96 kg/m2 31.07 kg/m2 32.43 kg/m2 35.97 kg/m2   BSA (m2) 2.17 m2 2.24 m2 2.21 m2 2.25 m2 2.25 m2 2.26 m2 2.36 m2   Visit Report    Report  Report       Physical Exam:  General: Not in distress, cooperative  HEENT: No tenderness to palpation of bilateral temples, forehead; TM normal, rhinorrhea with clear fluid, post nasal drip, receding gum line without teeth, moist mucous membranes.  Neck: Supple, no adenopathy.  Cardiac: RRR, S1/S2 audible, no murmurs or gallops.  Chest: CTAB, effort normal, no wheezing or rhonchi.  Abdomen: Soft, non-tender, non-distended, bowel sounds present.  Extremities: No edema, atraumatic.  Hospital Outpatient Visit on 08/21/2024   Component Date Value Ref Range Status    Albumin 08/21/2024 4.6  3.4 - 5.0 g/dL Final    Bilirubin, Total 08/21/2024 0.5  0.0 - 1.2 mg/dL Final    Bilirubin, Direct 08/21/2024 0.1  0.0 - 0.3 mg/dL Final    Alkaline Phosphatase 08/21/2024 80  33 - 120 U/L Final    ALT 08/21/2024 27  10 - 52 U/L Final    Patients treated with Sulfasalazine may generate falsely decreased results for ALT.    AST 08/21/2024 23  9 - 39 U/L Final    Total Protein 08/21/2024 7.4  6.4 - 8.2 g/dL Final    WBC 08/21/2024 8.0  4.4 - 11.3 x10*3/uL Final    nRBC 08/21/2024 0.0  0.0 - 0.0 /100 WBCs Final    RBC 08/21/2024 5.20  4.50 - 5.90 x10*6/uL Final    Hemoglobin 08/21/2024 15.0  13.5 - 17.5 g/dL Final    Hematocrit " 08/21/2024 42.5  41.0 - 52.0 % Final    MCV 08/21/2024 82  80 - 100 fL Final    MCH 08/21/2024 28.8  26.0 - 34.0 pg Final    MCHC 08/21/2024 35.3  32.0 - 36.0 g/dL Final    RDW 08/21/2024 12.4  11.5 - 14.5 % Final    Platelets 08/21/2024 231  150 - 450 x10*3/uL Final    Neutrophils % 08/21/2024 64.5  40.0 - 80.0 % Final    Immature Granulocytes %, Automated 08/21/2024 0.4  0.0 - 0.9 % Final    Immature Granulocyte Count (IG) includes promyelocytes, myelocytes and metamyelocytes but does not include bands. Percent differential counts (%) should be interpreted in the context of the absolute cell counts (cells/UL).    Lymphocytes % 08/21/2024 21.1  13.0 - 44.0 % Final    Monocytes % 08/21/2024 9.2  2.0 - 10.0 % Final    Eosinophils % 08/21/2024 4.4  0.0 - 6.0 % Final    Basophils % 08/21/2024 0.4  0.0 - 2.0 % Final    Neutrophils Absolute 08/21/2024 5.17  1.20 - 7.70 x10*3/uL Final    Percent differential counts (%) should be interpreted in the context of the absolute cell counts (cells/uL).    Immature Granulocytes Absolute, Au* 08/21/2024 0.03  0.00 - 0.70 x10*3/uL Final    Lymphocytes Absolute 08/21/2024 1.69  1.20 - 4.80 x10*3/uL Final    Monocytes Absolute 08/21/2024 0.74  0.10 - 1.00 x10*3/uL Final    Eosinophils Absolute 08/21/2024 0.35  0.00 - 0.70 x10*3/uL Final    Basophils Absolute 08/21/2024 0.03  0.00 - 0.10 x10*3/uL Final    Thyroid Stimulating Hormone 08/21/2024 4.75 (H)  0.44 - 3.98 mIU/L Final    Color, Urine 08/21/2024 Yellow  Light-Yellow, Yellow, Dark-Yellow Final    Appearance, Urine 08/21/2024 Clear  Clear Final    Specific Gravity, Urine 08/21/2024 1.027  1.005 - 1.035 Final    pH, Urine 08/21/2024 5.5  5.0, 5.5, 6.0, 6.5, 7.0, 7.5, 8.0 Final    Protein, Urine 08/21/2024 10 (TRACE)  NEGATIVE, 10 (TRACE), 20 (TRACE) mg/dL Final    Glucose, Urine 08/21/2024 70 (1+) (A)  Normal mg/dL Final    Blood, Urine 08/21/2024 NEGATIVE  NEGATIVE Final    Ketones, Urine 08/21/2024 NEGATIVE  NEGATIVE mg/dL Final     Bilirubin, Urine 08/21/2024 NEGATIVE  NEGATIVE Final    Urobilinogen, Urine 08/21/2024 Normal  Normal mg/dL Final    Nitrite, Urine 08/21/2024 NEGATIVE  NEGATIVE Final    Leukocyte Esterase, Urine 08/21/2024 NEGATIVE  NEGATIVE Final    Vitamin D, 25-Hydroxy, Total 08/21/2024 53  30 - 100 ng/mL Final    Glucose 08/21/2024 82  74 - 99 mg/dL Final    Sodium 08/21/2024 138  136 - 145 mmol/L Final    Potassium 08/21/2024 3.7  3.5 - 5.3 mmol/L Final    Chloride 08/21/2024 101  98 - 107 mmol/L Final    Bicarbonate 08/21/2024 28  21 - 32 mmol/L Final    Anion Gap 08/21/2024 13  10 - 20 mmol/L Final    Urea Nitrogen 08/21/2024 15  6 - 23 mg/dL Final    Creatinine 08/21/2024 1.06  0.50 - 1.30 mg/dL Final    eGFR 08/21/2024 >90  >60 mL/min/1.73m*2 Final    Calculations of estimated GFR are performed using the 2021 CKD-EPI Study Refit equation without the race variable for the IDMS-Traceable creatinine methods.  https://jasn.asnjournals.org/content/early/2021/09/22/ASN.9826784557    Calcium 08/21/2024 9.9  8.6 - 10.6 mg/dL Final    Phosphorus 08/21/2024 2.4 (L)  2.5 - 4.9 mg/dL Final    The performance characteristics of phosphorus testing in heparinized plasma have been validated by the individual  laboratory site where testing is performed. Testing on heparinized plasma is not approved by the FDA; however, such approval is not necessary.    WBC, Urine 08/21/2024 NONE  1-5, NONE /HPF Final    RBC, Urine 08/21/2024 1-2  NONE, 1-2, 3-5 /HPF Final    Mucus, Urine 08/21/2024 1+  Reference range not established. /LPF Final    Hyaline Casts, Urine 08/21/2024 2+ (A)  NONE /LPF Final    Thyroxine, Free 08/21/2024 1.24  0.78 - 1.48 ng/dL Final        Assessment/Plan: Alek is a 24 year old with a history of nasopharyngeal carcinoma T2N2M0 treated as per [id number] end of therapy August 2019 who presents for survivorship visit today with his dad. He has issues with hypothyroid, sinusitis, needs dentures, and has recently been  having headaches.     Oncology: Hx T2N2M0 nasopharyngeal carcinoma treated per ARAR 0331:  No further disease surveillance scan needed   Auditory: At risk for ototoxicity due to radiation as well as cisplatin, follows with ENT  CNS: At risk for neurocognitive deficits due to radiation.  Referral placed for neurocognitive testing today, Additionally, he has been having headaches.  These headaches could be explained by several possible etiologies including vision, sinus inflammation, family history of migraines.  Counseled Alek to notify us if worsening, because would obtain imaging as he is at risk for secondary malignancy due to XRT.   I did place a referral to neurology for further work up today.   SMN: At risk for thyroid malignancy due to radiation. Thyroid ultraaosund without nodules today. At risk of brain tumor (benign/or malignant ) from radiation. Risk of t-AML from cisplatin. Continue to monitor for any concerning symptoms.  CBC with each visit. Thyroid ultrasound every 3 years.   Dental: At risk for osteoradionecrosis of jaw. He dos not have teeth and is awaiting new set of dentures. Has dysphagia to solids which he attributes to decreased salivary production. Counseled on OTC hydrating mouthwash.   Dermatologic: At risk for dermatologic toxicity from radiation.  No concerns on history or physical today.   Immune At risk for chronic sinusitis due to radiation. Sees ENT locally.   Ocular: at risk for ocular toxicity due to radiation. Encouraged follow up with optometry.   Psychological: At risk of adverse psychosocial/quality of life effects due to history of cancer. Had routine distress screening from our psychiatric NP Tosin Owusu today.   Endocrine: At risk for gonatoropin deficiency due to radiation, testicular hormone dysfunction from cisplatin, hypothyroid from radiation.  Continues with endocrine Dr Knowles in [place].  I contacted him and sent him his passport for care treatment summary so he has  his therapy and surveillance information. TSH was slightly elevated.  Endocrine recently increased his levothyroxine.   Urinary: at risk of renal toxicity from cisplatin.  Obtained RFP and urine analysis today. BP was 120/81. No blood or protein in urine. Cr is stable at 1.06 today.      08/26/24 at 10:29 AM - Lauren Lee MD  I saw and evaluated the patient. I personally obtained the key and critical portions of the history and physical exam or was physically present for key and critical portions performed by the resident/fellow. I reviewed the resident/fellow's documentation and discussed the patient with the resident/fellow. I agree with the resident/fellow's medical decision making as documented in the note. I have edited the  note above to reflect my findings.     Lauren Lee MD

## 2024-08-21 NOTE — PROGRESS NOTES
SW met with pt and dad during clinic survivorship visit. Pt and dad report food insecurity; pt also with questions about job/work advocacy and accommodations. Dad shared they go to all 3 local [employer] regularly; SW discussed [employer] as additional resource to try. SW provided pt with info to connect with [facility] and [facility] [place].     VALENTIN Georges, NATALIE  Social Work   Pediatric Hematology/Oncology  [phone]

## 2024-08-26 ASSESSMENT — ENCOUNTER SYMPTOMS
HEMATOLOGIC/LYMPHATIC NEGATIVE: 1
TROUBLE SWALLOWING: 1
HEADACHES: 1
MUSCULOSKELETAL NEGATIVE: 1
CONSTITUTIONAL NEGATIVE: 1
PSYCHIATRIC NEGATIVE: 1
EYES NEGATIVE: 1
CARDIOVASCULAR NEGATIVE: 1
GASTROINTESTINAL NEGATIVE: 1
RESPIRATORY NEGATIVE: 1

## 2025-02-11 ENCOUNTER — APPOINTMENT (OUTPATIENT)
Dept: NEUROLOGY | Facility: CLINIC | Age: 25
End: 2025-02-11
Payer: MEDICARE

## 2025-02-11 VITALS
RESPIRATION RATE: 16 BRPM | SYSTOLIC BLOOD PRESSURE: 116 MMHG | DIASTOLIC BLOOD PRESSURE: 73 MMHG | HEART RATE: 93 BPM | TEMPERATURE: 97.6 F

## 2025-02-11 DIAGNOSIS — R51.9 CHRONIC DAILY HEADACHE: Primary | ICD-10-CM

## 2025-02-11 PROCEDURE — 99204 OFFICE O/P NEW MOD 45 MIN: CPT | Performed by: NURSE PRACTITIONER

## 2025-02-11 RX ORDER — NORTRIPTYLINE HYDROCHLORIDE 10 MG/1
10 CAPSULE ORAL NIGHTLY
Qty: 30 CAPSULE | Refills: 1 | Status: SHIPPED | OUTPATIENT
Start: 2025-02-11

## 2025-02-11 RX ORDER — LORATADINE 10 MG/1
1 TABLET ORAL
COMMUNITY
Start: 2024-12-14

## 2025-02-11 RX ORDER — OMEPRAZOLE 40 MG/1
1 CAPSULE, DELAYED RELEASE ORAL
COMMUNITY
Start: 2024-06-27

## 2025-02-11 ASSESSMENT — PAIN SCALES - GENERAL: PAINLEVEL_OUTOF10: 0-NO PAIN

## 2025-02-11 NOTE — PROGRESS NOTES
"Chief Complaint   Patient presents with    New Patient Visit    Headache     Pt presents today for c/o headaches and memory issues   Stated having headaches 2-3 years after cancer treatment   Headaches are throbbing/pulsating located across forehead and bilateral temporals. Headaches typically last 20-30 minutes and at times 2-3 times a week headache goes away without pharmacological intervention. At times uses aleve for headaches. Experiences 3-5 headaches a week, takes aleve for at least 2-3  headaches that he is experiencing in a 1 week period.   Subjective   HPI  Alek Frederick is a 24 y.o. year old male who presents with chief complaint No primary diagnosis found.    Alek started getting headaches at age   Headaches gradually worsening in frequency and severity.   Currently experiencing 20-30 HA days per month.   Triggers include .none   Aura-\"vision split\"   The headaches are usually pounding and throbbing and are located across forehead and bilateral temporal area, generally symmetric and unilateral at times   The patient rates the most severe headaches a 5-6 in intensity.   Generally, headaches last about 20-30minues in duration.   Associated photophobia.   Headaches are not worsened with exertion.   Work attendance or other daily activities affected: declines     Sleep: 4-6 per night  Caffeine: 5-6 cans of pepsi per day , lass then a glass of water daily   Diet/exercise: denies   Stressors: yes     Head or neck injuries/MVC: denies   Previous head CT/ brain MRI?   Recent labs?  Endorses regular vision checkups.  Glasses up to date, visit check last year   Endorses regular dental checkups. No teeth, sees dentist yearly     Current Outpatient Medications:     Allergy Relief, loratadine, 10 mg tablet, Take 1 tablet (10 mg) by mouth early in the morning.., Disp: , Rfl:     buPROPion XL (Wellbutrin XL) 150 mg 24 hr tablet, , Disp: , Rfl:     cholecalciferol (Vitamin D3) 50 mcg (2,000 unit) capsule, Take 1 " capsule (50 mcg) by mouth once daily., Disp: 30 capsule, Rfl: 0    levothyroxine (Synthroid) 20 mcg/mL solution, 5 mL (100 mcg)., Disp: , Rfl:     omeprazole (PriLOSEC) 40 mg DR capsule, Take 1 capsule (40 mg) by mouth early in the morning.., Disp: , Rfl:     sertraline (Zoloft) 50 mg tablet, Take 1 tablet (50 mg) by mouth once daily at bedtime., Disp: , Rfl:     No Known Allergies    Past Medical History:   Diagnosis Date    Personal history of other diseases of the digestive system     History of gastroesophageal reflux (GERD)    Personal history of other diseases of the respiratory system     History of asthma    Personal history of other diseases of the respiratory system     History of bronchitis    Personal history of other mental and behavioral disorders     History of depression    Personal history of other mental and behavioral disorders     History of mental disorder    Personal history of other mental and behavioral disorders 04/24/2019    History of autism     Past Surgical History:   Procedure Laterality Date    OTHER SURGICAL HISTORY  07/22/2019    Central venous catheter insertion     No family history on file.  Social History     Tobacco Use    Smoking status: Never    Smokeless tobacco: Never   Substance Use Topics    Alcohol use: Not on file     Social History     Substance and Sexual Activity   Drug Use Not on file          General Appearance: Alek is well-developed, well-nourished, 24 y.o. year old male, in no acute distress. Makes good eye contact, is alert, interactive, and cooperative. Demonstrates recent & remote memory recall. Subjective information consistent with objective assessment.     Vitals:    02/11/25 1045   BP: 116/73   Pulse: 93   Resp: 16   Temp: 36.4 °C (97.6 °F)   TempSrc: Temporal   PainSc: 0-No pain       Lab Results   Component Value Date    WBC 8.0 08/21/2024    RBC 5.20 08/21/2024    HGB 15.0 08/21/2024    HCT 42.5 08/21/2024     08/21/2024     08/21/2024     K 3.7 08/21/2024     08/21/2024    BUN 15 08/21/2024    CREATININE 1.06 08/21/2024    EGFR >90 08/21/2024    CALCIUM 9.9 08/21/2024    ALKPHOS 80 08/21/2024    AST 23 08/21/2024    ALT 27 08/21/2024    MG 1.57 (L) 01/05/2024    VITD25 53 08/21/2024    HGBA1C 5.3 01/12/2023    TSH 4.75 (H) 08/21/2024     Station stable with a normal base. Gait stable with a normal arm swing and speed. No ataxia, shuffling, steppage or waddling present. No circumduction was present.   No Romberg sign present.    RECORDS REVIEW:  Previous records (provider notes, laboratory reports, and imaging studies were reviewed and summarized).      Assessment & Plan  Chronic daily headache              ASSESSMENT/PLAN:  Increase fluid intake, decrease the amount of pepsi cans a day   Start nortriptyline 10mg at bedtime.   Follow-up in 8 weeks or sooner if needed.      ALINA LEO

## 2025-04-08 ENCOUNTER — APPOINTMENT (OUTPATIENT)
Dept: NEUROLOGY | Facility: CLINIC | Age: 25
End: 2025-04-08
Payer: MEDICARE